# Patient Record
Sex: MALE | Race: WHITE | NOT HISPANIC OR LATINO | Employment: OTHER | URBAN - METROPOLITAN AREA
[De-identification: names, ages, dates, MRNs, and addresses within clinical notes are randomized per-mention and may not be internally consistent; named-entity substitution may affect disease eponyms.]

---

## 2017-03-02 RX ORDER — TRIAMTERENE AND HYDROCHLOROTHIAZIDE 75; 50 MG/1; MG/1
TABLET ORAL
Qty: 90 TABLET | Refills: 2 | Status: SHIPPED | OUTPATIENT
Start: 2017-03-02 | End: 2017-10-22 | Stop reason: SDUPTHER

## 2017-10-23 RX ORDER — TRIAMTERENE AND HYDROCHLOROTHIAZIDE 75; 50 MG/1; MG/1
TABLET ORAL
Qty: 30 TABLET | Refills: 1 | Status: SHIPPED | OUTPATIENT
Start: 2017-10-23 | End: 2018-05-31 | Stop reason: DRUGHIGH

## 2018-03-09 RX ORDER — TRIAMTERENE AND HYDROCHLOROTHIAZIDE 75; 50 MG/1; MG/1
TABLET ORAL
Qty: 30 TABLET | Refills: 1 | Status: SHIPPED | OUTPATIENT
Start: 2018-03-09 | End: 2018-05-13 | Stop reason: SDUPTHER

## 2018-05-14 ENCOUNTER — OFFICE VISIT (OUTPATIENT)
Dept: FAMILY MEDICINE CLINIC | Facility: CLINIC | Age: 73
End: 2018-05-14

## 2018-05-14 VITALS
HEART RATE: 90 BPM | BODY MASS INDEX: 26.27 KG/M2 | OXYGEN SATURATION: 96 % | DIASTOLIC BLOOD PRESSURE: 78 MMHG | SYSTOLIC BLOOD PRESSURE: 130 MMHG | HEIGHT: 67 IN | TEMPERATURE: 98.6 F | WEIGHT: 167.4 LBS

## 2018-05-14 DIAGNOSIS — J20.9 BRONCHITIS WITH BRONCHOSPASM: Primary | ICD-10-CM

## 2018-05-14 DIAGNOSIS — J01.00 ACUTE MAXILLARY SINUSITIS, RECURRENCE NOT SPECIFIED: ICD-10-CM

## 2018-05-14 PROCEDURE — 71046 X-RAY EXAM CHEST 2 VIEWS: CPT | Performed by: INTERNAL MEDICINE

## 2018-05-14 PROCEDURE — 99213 OFFICE O/P EST LOW 20 MIN: CPT | Performed by: INTERNAL MEDICINE

## 2018-05-14 RX ORDER — ALBUTEROL SULFATE 90 UG/1
AEROSOL, METERED RESPIRATORY (INHALATION)
COMMUNITY
Start: 2018-05-11 | End: 2019-06-28

## 2018-05-14 RX ORDER — AMOXICILLIN AND CLAVULANATE POTASSIUM 875; 125 MG/1; MG/1
TABLET, FILM COATED ORAL
COMMUNITY
Start: 2018-05-11 | End: 2018-05-28

## 2018-05-14 RX ORDER — BROMPHENIRAMINE MALEATE, PSEUDOEPHEDRINE HYDROCHLORIDE, AND DEXTROMETHORPHAN HYDROBROMIDE 2; 30; 10 MG/5ML; MG/5ML; MG/5ML
SYRUP ORAL
COMMUNITY
Start: 2018-05-11 | End: 2019-06-28

## 2018-05-14 RX ORDER — METHYLPREDNISOLONE 4 MG/1
TABLET ORAL
COMMUNITY
Start: 2018-05-11 | End: 2019-06-28

## 2018-05-14 RX ORDER — TRIAMTERENE AND HYDROCHLOROTHIAZIDE 75; 50 MG/1; MG/1
TABLET ORAL
Qty: 30 TABLET | Refills: 0 | Status: SHIPPED | OUTPATIENT
Start: 2018-05-14 | End: 2018-05-14 | Stop reason: SDUPTHER

## 2018-05-14 NOTE — PROGRESS NOTES
Subjective   Grzegorz Price is a 72 y.o. male.   Coughing and sinus drainage from one month's time  History of Present Illness   Cough,  Sinus drainiage.  [place] hot  Temp recorded as 99.7 at the [facility] was seen twice here I believe was given amoxicillin initially on return was given Augmentin as well as Medrol Dosepak patient perceives himself as being better now cough tomatoes sinus drainage is now clear still mild cough feels much better including ears and sinuses no history for asthma or COPD.  Patient is felt to have a cure with HIFU for his prostate cancer still undergoing observation.  Very pleased with this no bladder issues or ED issues posttreatment  Review of Systems   All other systems reviewed and are negative.      Objective   Vitals:    05/14/18 1636   BP: 130/78   Pulse: 90   Temp: 98.6 °F (37 °C)   SpO2: 96%   Weight: 75.9 kg (167 lb 6.4 oz)     Physical Exam   Constitutional: He appears well-developed and well-nourished.   HENT:   Head: Normocephalic and atraumatic.   Right Ear: External ear normal.   Left TM obscured by cerumen   Eyes: Conjunctivae are normal. Pupils are equal, round, and reactive to light.   Cardiovascular: Normal rate and regular rhythm.    Pulmonary/Chest: Effort normal.   Faint  wheezes   Abdominal: Soft. Bowel sounds are normal.   Nursing note and vitals reviewed.      No results found for: INR    Procedures    Assessment/Plan     1.  Bronchitis with bronchospasm improved by history patient continues present course of medicine call regarding status in 3 days time i.e. Thursday if he still having problems I'll give him another antibiotic and probably Prednisone 40 mg ×2 days, 30 mg ×2 days, 20 mg ×2 days, 10 mg ×2 days.. Doubt that'll be necessary    2.  Maxillary sinusitis clinically responded to Augmentin plan complete course with call on Thursday to monitor his progress.  Further follow-up contingent on patient's response to medicine.    Much of this encounter note is  an electronic transcription/translation of spoken language to printed text.  The electronic translation of spoken language may permit erroneous, or at times, nonsensical words or phrases to be inadvertently transcribed.  Although I have reviewed the note for such errors, some may still exist. If there are questions or for further clarification, please contact me.

## 2018-05-28 ENCOUNTER — HOSPITAL ENCOUNTER (EMERGENCY)
Facility: HOSPITAL | Age: 73
Discharge: HOME | End: 2018-05-28
Attending: FAMILY MEDICINE | Admitting: FAMILY MEDICINE

## 2018-05-28 ENCOUNTER — APPOINTMENT (OUTPATIENT)
Dept: CT IMAGING | Facility: HOSPITAL | Age: 73
End: 2018-05-28

## 2018-05-28 VITALS
HEART RATE: 79 BPM | RESPIRATION RATE: 18 BRPM | DIASTOLIC BLOOD PRESSURE: 99 MMHG | OXYGEN SATURATION: 94 % | HEIGHT: 67 IN | WEIGHT: 165 LBS | TEMPERATURE: 98.2 F | BODY MASS INDEX: 25.9 KG/M2 | SYSTOLIC BLOOD PRESSURE: 180 MMHG

## 2018-05-28 DIAGNOSIS — H61.22 HEARING LOSS OF LEFT EAR DUE TO CERUMEN IMPACTION: ICD-10-CM

## 2018-05-28 DIAGNOSIS — N39.0 ACUTE UTI: Primary | ICD-10-CM

## 2018-05-28 DIAGNOSIS — K62.5 BRBPR (BRIGHT RED BLOOD PER RECTUM): ICD-10-CM

## 2018-05-28 LAB
ALBUMIN SERPL-MCNC: 3.8 G/DL (ref 3.5–5.2)
ALBUMIN/GLOB SERPL: 1.5 G/DL
ALP SERPL-CCNC: 45 U/L (ref 39–117)
ALT SERPL W P-5'-P-CCNC: 26 U/L (ref 1–41)
ANION GAP SERPL CALCULATED.3IONS-SCNC: 13.4 MMOL/L
AST SERPL-CCNC: 21 U/L (ref 1–40)
BACTERIA UR QL AUTO: ABNORMAL /HPF
BASOPHILS # BLD AUTO: 0.02 10*3/MM3 (ref 0–0.2)
BASOPHILS NFR BLD AUTO: 0.2 % (ref 0–1.5)
BILIRUB SERPL-MCNC: 0.6 MG/DL (ref 0.1–1.2)
BILIRUB UR QL STRIP: NEGATIVE
BUN BLD-MCNC: 20 MG/DL (ref 8–23)
BUN/CREAT SERPL: 19 (ref 7–25)
CALCIUM SPEC-SCNC: 8.9 MG/DL (ref 8.6–10.5)
CHLORIDE SERPL-SCNC: 101 MMOL/L (ref 98–107)
CLARITY UR: CLEAR
CO2 SERPL-SCNC: 26.6 MMOL/L (ref 22–29)
COLOR UR: ABNORMAL
CREAT BLD-MCNC: 1.05 MG/DL (ref 0.76–1.27)
DEPRECATED RDW RBC AUTO: 49.9 FL (ref 37–54)
EOSINOPHIL # BLD AUTO: 0.08 10*3/MM3 (ref 0–0.7)
EOSINOPHIL NFR BLD AUTO: 0.8 % (ref 0.3–6.2)
ERYTHROCYTE [DISTWIDTH] IN BLOOD BY AUTOMATED COUNT: 13.9 % (ref 11.5–14.5)
GFR SERPL CREATININE-BSD FRML MDRD: 70 ML/MIN/1.73
GLOBULIN UR ELPH-MCNC: 2.6 GM/DL
GLUCOSE BLD-MCNC: 90 MG/DL (ref 65–99)
GLUCOSE UR STRIP-MCNC: NEGATIVE MG/DL
HCT VFR BLD AUTO: 56.3 % (ref 40.4–52.2)
HGB BLD-MCNC: 18.9 G/DL (ref 13.7–17.6)
HGB UR QL STRIP.AUTO: NEGATIVE
HOLD SPECIMEN: NORMAL
HOLD SPECIMEN: NORMAL
HYALINE CASTS UR QL AUTO: ABNORMAL /LPF
IMM GRANULOCYTES # BLD: 0 10*3/MM3 (ref 0–0.03)
IMM GRANULOCYTES NFR BLD: 0 % (ref 0–0.5)
KETONES UR QL STRIP: ABNORMAL
LEUKOCYTE ESTERASE UR QL STRIP.AUTO: ABNORMAL
LYMPHOCYTES # BLD AUTO: 1.24 10*3/MM3 (ref 0.9–4.8)
LYMPHOCYTES NFR BLD AUTO: 13 % (ref 19.6–45.3)
MCH RBC QN AUTO: 33 PG (ref 27–32.7)
MCHC RBC AUTO-ENTMCNC: 33.6 G/DL (ref 32.6–36.4)
MCV RBC AUTO: 98.4 FL (ref 79.8–96.2)
MONOCYTES # BLD AUTO: 0.74 10*3/MM3 (ref 0.2–1.2)
MONOCYTES NFR BLD AUTO: 7.8 % (ref 5–12)
MUCOUS THREADS URNS QL MICRO: ABNORMAL /HPF
NEUTROPHILS # BLD AUTO: 7.46 10*3/MM3 (ref 1.9–8.1)
NEUTROPHILS NFR BLD AUTO: 78.2 % (ref 42.7–76)
NITRITE UR QL STRIP: NEGATIVE
PH UR STRIP.AUTO: 6.5 [PH] (ref 5–8)
PLATELET # BLD AUTO: 270 10*3/MM3 (ref 140–500)
PMV BLD AUTO: 10.5 FL (ref 6–12)
POTASSIUM BLD-SCNC: 3.4 MMOL/L (ref 3.5–5.2)
PROT SERPL-MCNC: 6.4 G/DL (ref 6–8.5)
PROT UR QL STRIP: ABNORMAL
RBC # BLD AUTO: 5.72 10*6/MM3 (ref 4.6–6)
RBC # UR: ABNORMAL /HPF
REF LAB TEST METHOD: ABNORMAL
SODIUM BLD-SCNC: 141 MMOL/L (ref 136–145)
SP GR UR STRIP: >=1.03 (ref 1–1.03)
SQUAMOUS #/AREA URNS HPF: ABNORMAL /HPF
UROBILINOGEN UR QL STRIP: ABNORMAL
WBC NRBC COR # BLD: 9.54 10*3/MM3 (ref 4.5–10.7)
WBC UR QL AUTO: ABNORMAL /HPF
WHOLE BLOOD HOLD SPECIMEN: NORMAL
WHOLE BLOOD HOLD SPECIMEN: NORMAL

## 2018-05-28 PROCEDURE — 99285 EMERGENCY DEPT VISIT HI MDM: CPT

## 2018-05-28 PROCEDURE — 74177 CT ABD & PELVIS W/CONTRAST: CPT

## 2018-05-28 PROCEDURE — 81001 URINALYSIS AUTO W/SCOPE: CPT | Performed by: FAMILY MEDICINE

## 2018-05-28 PROCEDURE — 80053 COMPREHEN METABOLIC PANEL: CPT | Performed by: NURSE PRACTITIONER

## 2018-05-28 PROCEDURE — 25010000002 IOPAMIDOL 61 % SOLUTION: Performed by: FAMILY MEDICINE

## 2018-05-28 PROCEDURE — 85025 COMPLETE CBC W/AUTO DIFF WBC: CPT | Performed by: NURSE PRACTITIONER

## 2018-05-28 PROCEDURE — 96360 HYDRATION IV INFUSION INIT: CPT

## 2018-05-28 RX ORDER — CEFDINIR 300 MG/1
300 CAPSULE ORAL 2 TIMES DAILY
Qty: 14 CAPSULE | Refills: 0 | Status: SHIPPED | OUTPATIENT
Start: 2018-05-28 | End: 2018-06-04

## 2018-05-28 RX ORDER — CEFDINIR 300 MG/1
300 CAPSULE ORAL 2 TIMES DAILY
Qty: 14 CAPSULE | Refills: 0 | Status: SHIPPED | OUTPATIENT
Start: 2018-05-28 | End: 2018-05-28

## 2018-05-28 RX ORDER — SODIUM CHLORIDE 0.9 % (FLUSH) 0.9 %
10 SYRINGE (ML) INJECTION AS NEEDED
Status: DISCONTINUED | OUTPATIENT
Start: 2018-05-28 | End: 2018-05-28 | Stop reason: HOSPADM

## 2018-05-28 RX ADMIN — SODIUM CHLORIDE 1000 ML: 9 INJECTION, SOLUTION INTRAVENOUS at 11:24

## 2018-05-28 RX ADMIN — IOPAMIDOL 85 ML: 612 INJECTION, SOLUTION INTRAVENOUS at 11:34

## 2018-05-29 ENCOUNTER — TELEPHONE (OUTPATIENT)
Dept: FAMILY MEDICINE CLINIC | Facility: CLINIC | Age: 73
End: 2018-05-29

## 2018-05-29 ENCOUNTER — TELEPHONE (OUTPATIENT)
Dept: SOCIAL WORK | Facility: HOSPITAL | Age: 73
End: 2018-05-29

## 2018-05-29 DIAGNOSIS — Z12.11 ENCOUNTER FOR SCREENING COLONOSCOPY: Primary | ICD-10-CM

## 2018-05-29 NOTE — TELEPHONE ENCOUNTER
Dr. Arreola Sanchez retired perhaps 2 years ago if he simply needs a colonoscopy refer him to  Dr. Sloan or GI of choice

## 2018-05-29 NOTE — TELEPHONE ENCOUNTER
PT WANTS TO KNOW IF DR. FINN IS STILL PRACTICING HERE IN [place]? HE STATES THAT HE WAS WHO DID HE LAST COLONOSCOPY AND AFTER GOING TO THE EMERGENCY ROOM YESTERDAY, HE WAS TOLD HE NEEDED TO BE SEEN AND HAVE ANOTHER ONE.  THEY REFERRED HIM TO A DIFFERENT PROCTOLOGIST, BUT PT IS ASKING IF HE CAN BE SEEN BY HIS PREVIOUS DR MELGAR?

## 2018-05-29 NOTE — TELEPHONE ENCOUNTER
ER F/U phone call:   Pt states that he is feeling some better. He intends on keeping all scheduled appointments. No other questions or concerns voiced at this time. Inés Ash RN

## 2018-05-31 ENCOUNTER — OFFICE VISIT (OUTPATIENT)
Dept: FAMILY MEDICINE CLINIC | Facility: CLINIC | Age: 73
End: 2018-05-31

## 2018-05-31 VITALS
BODY MASS INDEX: 24.92 KG/M2 | HEIGHT: 67 IN | OXYGEN SATURATION: 98 % | TEMPERATURE: 98.2 F | HEART RATE: 110 BPM | SYSTOLIC BLOOD PRESSURE: 140 MMHG | DIASTOLIC BLOOD PRESSURE: 100 MMHG | WEIGHT: 158.8 LBS

## 2018-05-31 DIAGNOSIS — H61.22 IMPACTED CERUMEN OF LEFT EAR: ICD-10-CM

## 2018-05-31 DIAGNOSIS — D58.2 ELEVATED HEMOGLOBIN (HCC): ICD-10-CM

## 2018-05-31 DIAGNOSIS — N39.0 URINARY TRACT INFECTION WITHOUT HEMATURIA, SITE UNSPECIFIED: Primary | ICD-10-CM

## 2018-05-31 DIAGNOSIS — K62.5 RECTAL BLEEDING: ICD-10-CM

## 2018-05-31 LAB
BACTERIA UR QL AUTO: ABNORMAL /HPF
BILIRUB UR QL STRIP: NEGATIVE
CLARITY UR: CLEAR
COLOR UR: YELLOW
GLUCOSE UR STRIP-MCNC: NEGATIVE MG/DL
HGB UR QL STRIP.AUTO: NEGATIVE
KETONES UR QL STRIP: ABNORMAL
LEUKOCYTE ESTERASE UR QL STRIP.AUTO: ABNORMAL
NITRITE UR QL STRIP: NEGATIVE
PH UR STRIP.AUTO: 7 [PH] (ref 4.6–8)
PROT UR QL STRIP: ABNORMAL
RBC # UR: ABNORMAL /HPF
REF LAB TEST METHOD: ABNORMAL
SP GR UR STRIP: 1.02 (ref 1–1.03)
SQUAMOUS #/AREA URNS HPF: ABNORMAL /HPF
UROBILINOGEN UR QL STRIP: ABNORMAL
WBC UR QL AUTO: ABNORMAL /HPF

## 2018-05-31 PROCEDURE — 69209 REMOVE IMPACTED EAR WAX UNI: CPT | Performed by: INTERNAL MEDICINE

## 2018-05-31 PROCEDURE — 99214 OFFICE O/P EST MOD 30 MIN: CPT | Performed by: INTERNAL MEDICINE

## 2018-05-31 PROCEDURE — 81001 URINALYSIS AUTO W/SCOPE: CPT | Performed by: INTERNAL MEDICINE

## 2018-05-31 NOTE — PROGRESS NOTES
Subjective   Grzegorz Price is a 72 y.o. male.   Recent ER visit with rectal bleeding seen ER released hemoglobin was satisfactory.  CAT scan abdomen was obtained  History of Present Illness   Rectal bleeding  - ct did not show any acute abnormalities did show enlargement of prostate or 0.8 cm which is unchanged patient has hadHIFU treatment for prostate cancer blood pressure up slightly today but missed 2 days of his blood pressure medication and took it again yesterday.  CAT scan did show evidence of sigmoid diverticulosis borderline aneurysmal dilatation 2.3 cm also also unchanged  Missed 2 days of bp med.  Currently on antibiotics for UTI does need follow-up colonoscopy for his rectal bleeding had one several years past with Dr. Zaman who is now retired.  Will refer patient Dr. Milner  Review of Systems   All other systems reviewed and are negative.      Objective   Vitals:    05/31/18 1119   BP: 140/100   Pulse: 110   Temp: 98.2 °F (36.8 °C)   SpO2: 98%   Weight: 72 kg (158 lb 12.8 oz)     Physical Exam   Constitutional: He appears well-developed and well-nourished.   HENT:   Head: Atraumatic.   Right Ear: External ear normal.   Moderate cerumen impaction left ear was irrigated out with H2O with resolution of cerumen impaction left TM unremarkable appearance as is ear canal also post-irrigation   Cardiovascular: Normal rate, regular rhythm and normal heart sounds.    Pulmonary/Chest: Effort normal and breath sounds normal.   Nursing note and vitals reviewed.      No results found for: INR    Procedures   Patient had irrigation left ear with water with resolution of cerumen impaction on left procedure tolerated well without any TM problems or irritation ear canal.  Patient told patient already procedure well  Assessment/Plan     1.  UTI improved continue present treatment await pending UA    2.  Rectal bleeding plan get updated CBC for general surgeon Dr. Norman for colonoscopy    3.  Cerumen impaction left  ear left ear irrigated with resolution patient does have hearing aids which are likely reason for this.  Follow-up when necessary.    4.  Elevated hemoglobin await pending lab if still elevated saturation for hematology referral.  Patient does take supplemental testosterone.    Much of this encounter note is an electronic transcription/translation of spoken language to printed text.  The electronic translation of spoken language may permit erroneous, or at times, nonsensical words or phrases to be inadvertently transcribed.  Although I have reviewed the note for such errors, some may still exist. If there are questions or for further clarification, please contact me.

## 2018-06-01 DIAGNOSIS — N39.0 URINARY TRACT INFECTION WITHOUT HEMATURIA, SITE UNSPECIFIED: Primary | ICD-10-CM

## 2018-06-18 RX ORDER — TRIAMTERENE AND HYDROCHLOROTHIAZIDE 75; 50 MG/1; MG/1
TABLET ORAL
Qty: 30 TABLET | Refills: 0 | Status: SHIPPED | OUTPATIENT
Start: 2018-06-18 | End: 2018-07-20 | Stop reason: SDUPTHER

## 2018-06-19 ENCOUNTER — PREP FOR SURGERY (OUTPATIENT)
Dept: OTHER | Facility: HOSPITAL | Age: 73
End: 2018-06-19

## 2018-06-19 DIAGNOSIS — Z12.11 SCREENING FOR MALIGNANT NEOPLASM OF COLON: Primary | ICD-10-CM

## 2018-06-19 RX ORDER — SODIUM CHLORIDE, SODIUM LACTATE, POTASSIUM CHLORIDE, CALCIUM CHLORIDE 600; 310; 30; 20 MG/100ML; MG/100ML; MG/100ML; MG/100ML
30 INJECTION, SOLUTION INTRAVENOUS CONTINUOUS
Status: CANCELLED | OUTPATIENT
Start: 2018-07-23

## 2018-06-20 PROBLEM — Z12.11 SCREENING FOR MALIGNANT NEOPLASM OF COLON: Status: ACTIVE | Noted: 2018-06-20

## 2018-07-20 RX ORDER — TRIAMTERENE AND HYDROCHLOROTHIAZIDE 75; 50 MG/1; MG/1
TABLET ORAL
Qty: 30 TABLET | Refills: 0 | Status: SHIPPED | OUTPATIENT
Start: 2018-07-20 | End: 2018-07-23 | Stop reason: HOSPADM

## 2018-07-23 ENCOUNTER — ANESTHESIA (OUTPATIENT)
Dept: GASTROENTEROLOGY | Facility: HOSPITAL | Age: 73
End: 2018-07-23

## 2018-07-23 ENCOUNTER — HOSPITAL ENCOUNTER (OUTPATIENT)
Facility: HOSPITAL | Age: 73
Setting detail: HOSPITAL OUTPATIENT SURGERY
Discharge: HOME | End: 2018-07-23
Attending: INTERNAL MEDICINE | Admitting: INTERNAL MEDICINE

## 2018-07-23 ENCOUNTER — ANESTHESIA EVENT (OUTPATIENT)
Dept: GASTROENTEROLOGY | Facility: HOSPITAL | Age: 73
End: 2018-07-23

## 2018-07-23 VITALS
HEART RATE: 63 BPM | DIASTOLIC BLOOD PRESSURE: 92 MMHG | OXYGEN SATURATION: 98 % | SYSTOLIC BLOOD PRESSURE: 158 MMHG | BODY MASS INDEX: 24.25 KG/M2 | TEMPERATURE: 97.7 F | HEIGHT: 67 IN | WEIGHT: 154.5 LBS | RESPIRATION RATE: 16 BRPM

## 2018-07-23 DIAGNOSIS — Z12.11 SCREENING FOR MALIGNANT NEOPLASM OF COLON: ICD-10-CM

## 2018-07-23 PROCEDURE — 45385 COLONOSCOPY W/LESION REMOVAL: CPT | Performed by: INTERNAL MEDICINE

## 2018-07-23 PROCEDURE — 45380 COLONOSCOPY AND BIOPSY: CPT | Performed by: INTERNAL MEDICINE

## 2018-07-23 PROCEDURE — 25010000002 PROPOFOL 10 MG/ML EMULSION: Performed by: NURSE ANESTHETIST, CERTIFIED REGISTERED

## 2018-07-23 PROCEDURE — 88305 TISSUE EXAM BY PATHOLOGIST: CPT | Performed by: INTERNAL MEDICINE

## 2018-07-23 RX ORDER — SODIUM CHLORIDE 0.9 % (FLUSH) 0.9 %
3 SYRINGE (ML) INJECTION AS NEEDED
Status: DISCONTINUED | OUTPATIENT
Start: 2018-07-23 | End: 2018-07-23 | Stop reason: HOSPADM

## 2018-07-23 RX ORDER — PROPOFOL 10 MG/ML
VIAL (ML) INTRAVENOUS AS NEEDED
Status: DISCONTINUED | OUTPATIENT
Start: 2018-07-23 | End: 2018-07-23 | Stop reason: SURG

## 2018-07-23 RX ORDER — LIDOCAINE HYDROCHLORIDE 20 MG/ML
INJECTION, SOLUTION INFILTRATION; PERINEURAL AS NEEDED
Status: DISCONTINUED | OUTPATIENT
Start: 2018-07-23 | End: 2018-07-23 | Stop reason: SURG

## 2018-07-23 RX ORDER — SODIUM CHLORIDE, SODIUM LACTATE, POTASSIUM CHLORIDE, CALCIUM CHLORIDE 600; 310; 30; 20 MG/100ML; MG/100ML; MG/100ML; MG/100ML
30 INJECTION, SOLUTION INTRAVENOUS CONTINUOUS
Status: DISCONTINUED | OUTPATIENT
Start: 2018-07-23 | End: 2018-07-23 | Stop reason: HOSPADM

## 2018-07-23 RX ORDER — SODIUM CHLORIDE, SODIUM LACTATE, POTASSIUM CHLORIDE, CALCIUM CHLORIDE 600; 310; 30; 20 MG/100ML; MG/100ML; MG/100ML; MG/100ML
30 INJECTION, SOLUTION INTRAVENOUS CONTINUOUS
Status: DISCONTINUED | OUTPATIENT
Start: 2018-07-23 | End: 2018-07-23

## 2018-07-23 RX ADMIN — PROPOFOL 20 MG: 10 INJECTION, EMULSION INTRAVENOUS at 10:01

## 2018-07-23 RX ADMIN — LIDOCAINE HYDROCHLORIDE 30 MG: 20 INJECTION, SOLUTION INFILTRATION; PERINEURAL at 09:55

## 2018-07-23 RX ADMIN — PROPOFOL 20 MG: 10 INJECTION, EMULSION INTRAVENOUS at 10:11

## 2018-07-23 RX ADMIN — EPHEDRINE SULFATE 15 MG: 50 INJECTION INTRAMUSCULAR; INTRAVENOUS; SUBCUTANEOUS at 10:15

## 2018-07-23 RX ADMIN — PROPOFOL 20 MG: 10 INJECTION, EMULSION INTRAVENOUS at 10:07

## 2018-07-23 RX ADMIN — PROPOFOL 20 MG: 10 INJECTION, EMULSION INTRAVENOUS at 10:05

## 2018-07-23 RX ADMIN — PROPOFOL 20 MG: 10 INJECTION, EMULSION INTRAVENOUS at 09:59

## 2018-07-23 RX ADMIN — PROPOFOL 20 MG: 10 INJECTION, EMULSION INTRAVENOUS at 10:03

## 2018-07-23 RX ADMIN — PROPOFOL 20 MG: 10 INJECTION, EMULSION INTRAVENOUS at 10:09

## 2018-07-23 RX ADMIN — PROPOFOL 30 MG: 10 INJECTION, EMULSION INTRAVENOUS at 09:57

## 2018-07-23 RX ADMIN — PROPOFOL 50 MG: 10 INJECTION, EMULSION INTRAVENOUS at 09:55

## 2018-07-23 RX ADMIN — SODIUM CHLORIDE, POTASSIUM CHLORIDE, SODIUM LACTATE AND CALCIUM CHLORIDE 30 ML/HR: 600; 310; 30; 20 INJECTION, SOLUTION INTRAVENOUS at 09:23

## 2018-07-23 RX ADMIN — PROPOFOL 20 MG: 10 INJECTION, EMULSION INTRAVENOUS at 10:13

## 2018-07-23 NOTE — ANESTHESIA PREPROCEDURE EVALUATION
Anesthesia Evaluation     Patient summary reviewed and Nursing notes reviewed   NPO Solid Status: > 8 hours  NPO Liquid Status: > 4 hours           Airway   Mallampati: II  Neck ROM: full  No difficulty expected  Dental      Comment: Upper partial    Pulmonary     breath sounds clear to auscultation  (+) a smoker Former,   Cardiovascular     Rhythm: regular    (+) hypertension,       Neuro/Psych  GI/Hepatic/Renal/Endo      Musculoskeletal     Abdominal    Substance History      OB/GYN          Other                        Anesthesia Plan    ASA 2     MAC     intravenous induction   Anesthetic plan and risks discussed with patient.

## 2018-07-23 NOTE — H&P
"[facility]  Pre Procedure History & Physical    Chief Complaint:   Time for my colonoscopy    Subjective     HPI:   72 y.o. male here for screening colonoscopy.  Last colonoscopy 2008 - diverticulosis and hemorrhoids.  Reports episode of rectal bleeding few weeks ago.  Occurred for 2 days.  No pain.  Red blood in toilet.  No family hx of colon CA.      Past Medical History:   Past Medical History:   Diagnosis Date   • Cerumen impaction    • Hypertension    • Prostate cancer (CMS/HCC)        Family History:  Family History   Problem Relation Age of Onset   • No Known Problems Mother    • Throat cancer Father        Social History:   reports that he has quit smoking. He quit after 40.00 years of use. He has never used smokeless tobacco. He reports that he does not drink alcohol or use drugs.    Medications:   Prescriptions Prior to Admission   Medication Sig Dispense Refill Last Dose   • azithromycin (ZITHROMAX) 250 MG tablet Take 2 tablets the first day, then 1 tablet daily for 4 days. 6 tablet 0 Not Taking   • benzonatate (TESSALON PERLES) 100 MG capsule 1-2 tabs q8h prn  #30 30 capsule 2 Not Taking   • brompheniramine-pseudoephedrine-DM 30-2-10 MG/5ML syrup    Not Taking   • MethylPREDNISolone (MEDROL, MING,) 4 MG tablet    Not Taking   • triamterene-hydrochlorothiazide (DYAZIDE) 37.5-25 MG per capsule Take 1 capsule by mouth Every Morning.   Taking   • triamterene-hydrochlorothiazide (MAXZIDE) 75-50 MG per tablet TAKE ONE TABLET BY MOUTH DAILY 30 tablet 0    • VENTOLIN  (90 Base) MCG/ACT inhaler    7/20/2018       Allergies:  Patient has no known allergies.    ROS:    Pertinent items are noted in HPI     Objective     Blood pressure 173/92, pulse 73, temperature 97.7 °F (36.5 °C), temperature source Oral, resp. rate 16, height 170.2 cm (67\"), weight 70.1 kg (154 lb 8 oz), SpO2 99 %.    Physical Exam   Constitutional: Pt is oriented to person, place, and time and well-developed, " well-nourished, and in no distress.   HENT:   Mouth/Throat: Oropharynx is clear and moist.   Neck: Normal range of motion. Neck supple.   Cardiovascular: Normal rate, regular rhythm and normal heart sounds.    Pulmonary/Chest: Effort normal and breath sounds normal. No respiratory distress. No  wheezes.   Abdominal: Soft. Bowel sounds are normal.   Skin: Skin is warm and dry.   Psychiatric: Mood, memory, affect and judgment normal.     Assessment/Plan     Diagnosis:  Encounter for screening for colon cancer    Anticipated Surgical Procedure:  Colonoscopy    The risks, benefits, and alternatives of this procedure have been discussed with the patient or the responsible party- the patient understands and agrees to proceed.

## 2018-07-23 NOTE — DISCHARGE INSTRUCTIONS
For the next 24 hours patient needs to be with a responsible adult.    For 24 hours DO NOT drive, operate machinery, appliances, drink alcohol, make important decisions or sign legal documents.    Start with a light or bland diet and advance to regular diet as tolerated.    Follow recommendations on procedure report provided by your doctor.    Call Dr Hilliard for problems [phone]    Problems may include but not limited to: large amounts of bleeding, trouble breathing, repeated vomiting, severe unrelieved pain, fever or chills.

## 2018-07-23 NOTE — ANESTHESIA POSTPROCEDURE EVALUATION
"Patient: Grzegorz Price    Procedure Summary     Date:  07/23/18 Room / Location:  [facility] ENDOSCOPY 10 / [facility] ENDOSCOPY    Anesthesia Start:  0952 Anesthesia Stop:  1021    Procedure:  COLONOSCOPY into cecum with polypectomy (N/A ) Diagnosis:       Screening for malignant neoplasm of colon      (Screening for malignant neoplasm of colon [Z12.11])    Surgeon:  Eyal Hilliard MD Provider:  Kush Costa MD    Anesthesia Type:  MAC ASA Status:  2          Anesthesia Type: MAC  Last vitals  BP   110/72 (07/23/18 1033)   Temp   36.5 °C (97.7 °F) (07/23/18 0906)   Pulse   83 (07/23/18 1033)   Resp   16 (07/23/18 1033)     SpO2   99 % (07/23/18 1033)     Post Anesthesia Care and Evaluation    Patient location during evaluation: bedside  Patient participation: complete - patient participated  Level of consciousness: awake and alert  Pain management: adequate  Airway patency: patent  Anesthetic complications: No anesthetic complications    Cardiovascular status: acceptable  Respiratory status: acceptable  Hydration status: acceptable    Comments: /72   Pulse 83   Temp 36.5 °C (97.7 °F) (Oral)   Resp 16   Ht 170.2 cm (67\")   Wt 70.1 kg (154 lb 8 oz)   SpO2 99%   BMI 24.20 kg/m²       "

## 2018-07-24 LAB
CYTO UR: NORMAL
LAB AP CASE REPORT: NORMAL
LAB AP CLINICAL INFORMATION: NORMAL
PATH REPORT.FINAL DX SPEC: NORMAL
PATH REPORT.GROSS SPEC: NORMAL

## 2018-08-14 ENCOUNTER — TELEPHONE (OUTPATIENT)
Dept: GASTROENTEROLOGY | Facility: CLINIC | Age: 73
End: 2018-08-14

## 2018-08-20 RX ORDER — TRIAMTERENE AND HYDROCHLOROTHIAZIDE 75; 50 MG/1; MG/1
TABLET ORAL
Qty: 30 TABLET | Refills: 0 | OUTPATIENT
Start: 2018-08-20

## 2018-08-20 NOTE — TELEPHONE ENCOUNTER
This would be for hypertension so if patient is off this and his blood pressure satisfactory don't see need to continue

## 2018-08-22 ENCOUNTER — TELEPHONE (OUTPATIENT)
Dept: FAMILY MEDICINE CLINIC | Facility: CLINIC | Age: 73
End: 2018-08-22

## 2018-08-22 RX ORDER — TRIAMTERENE AND HYDROCHLOROTHIAZIDE 37.5; 25 MG/1; MG/1
1 CAPSULE ORAL EVERY MORNING
Qty: 30 CAPSULE | Refills: 2 | Status: SHIPPED | OUTPATIENT
Start: 2018-08-22 | End: 2018-11-20 | Stop reason: SDUPTHER

## 2018-08-22 NOTE — TELEPHONE ENCOUNTER
PATIENT STATED THAT HOSPITAL DID NOT D/C HIS triamterene-hydrochlorothiazide (DYAZIDE) 37.5-25 MG per capsule AND WANTS TO KNOW IF RX CAN BE CALLED INTO PHARMACY

## 2018-11-20 RX ORDER — TRIAMTERENE AND HYDROCHLOROTHIAZIDE 37.5; 25 MG/1; MG/1
CAPSULE ORAL
Qty: 30 CAPSULE | Refills: 1 | Status: SHIPPED | OUTPATIENT
Start: 2018-11-20 | End: 2019-01-20 | Stop reason: SDUPTHER

## 2019-01-21 RX ORDER — TRIAMTERENE AND HYDROCHLOROTHIAZIDE 37.5; 25 MG/1; MG/1
CAPSULE ORAL
Qty: 30 CAPSULE | Refills: 0 | Status: SHIPPED | OUTPATIENT
Start: 2019-01-21 | End: 2019-02-20 | Stop reason: SDUPTHER

## 2019-02-20 RX ORDER — TRIAMTERENE AND HYDROCHLOROTHIAZIDE 37.5; 25 MG/1; MG/1
CAPSULE ORAL
Qty: 30 CAPSULE | Refills: 0 | Status: SHIPPED | OUTPATIENT
Start: 2019-02-20 | End: 2019-03-22 | Stop reason: SDUPTHER

## 2019-03-22 RX ORDER — TRIAMTERENE AND HYDROCHLOROTHIAZIDE 37.5; 25 MG/1; MG/1
CAPSULE ORAL
Qty: 30 CAPSULE | Refills: 0 | Status: SHIPPED | OUTPATIENT
Start: 2019-03-22 | End: 2019-04-22 | Stop reason: SDUPTHER

## 2019-04-22 RX ORDER — TRIAMTERENE AND HYDROCHLOROTHIAZIDE 37.5; 25 MG/1; MG/1
CAPSULE ORAL
Qty: 15 CAPSULE | Refills: 0 | Status: SHIPPED | OUTPATIENT
Start: 2019-04-22 | End: 2019-05-12 | Stop reason: SDUPTHER

## 2019-05-13 RX ORDER — TRIAMTERENE AND HYDROCHLOROTHIAZIDE 37.5; 25 MG/1; MG/1
CAPSULE ORAL
Qty: 15 CAPSULE | Refills: 0 | Status: SHIPPED | OUTPATIENT
Start: 2019-05-13 | End: 2019-05-31 | Stop reason: SDUPTHER

## 2019-05-31 RX ORDER — TRIAMTERENE AND HYDROCHLOROTHIAZIDE 37.5; 25 MG/1; MG/1
CAPSULE ORAL
Qty: 15 CAPSULE | Refills: 0 | Status: SHIPPED | OUTPATIENT
Start: 2019-05-31 | End: 2019-06-28 | Stop reason: SDUPTHER

## 2019-06-20 RX ORDER — TRIAMTERENE AND HYDROCHLOROTHIAZIDE 37.5; 25 MG/1; MG/1
CAPSULE ORAL
Qty: 15 CAPSULE | Refills: 0 | OUTPATIENT
Start: 2019-06-20

## 2019-06-25 RX ORDER — TRIAMTERENE AND HYDROCHLOROTHIAZIDE 37.5; 25 MG/1; MG/1
CAPSULE ORAL
Qty: 15 CAPSULE | Refills: 0 | OUTPATIENT
Start: 2019-06-25

## 2019-06-25 NOTE — TELEPHONE ENCOUNTER
REFILL ON triamterene-hydrochlorothiazide (DYAZIDE) 37.5-25 MG per capsule      PATIENT HAS APPT ON 06/28/19. PATIENT IS OUT CAN HE GET A REFILL

## 2019-06-28 ENCOUNTER — OFFICE VISIT (OUTPATIENT)
Dept: FAMILY MEDICINE CLINIC | Facility: CLINIC | Age: 74
End: 2019-06-28

## 2019-06-28 VITALS
WEIGHT: 153.2 LBS | TEMPERATURE: 98.4 F | HEART RATE: 74 BPM | DIASTOLIC BLOOD PRESSURE: 92 MMHG | BODY MASS INDEX: 21.93 KG/M2 | OXYGEN SATURATION: 96 % | SYSTOLIC BLOOD PRESSURE: 180 MMHG | HEIGHT: 70 IN

## 2019-06-28 DIAGNOSIS — I10 HYPERTENSION, ESSENTIAL: Primary | ICD-10-CM

## 2019-06-28 DIAGNOSIS — J98.09 RECURRENT BRONCHOSPASM: ICD-10-CM

## 2019-06-28 DIAGNOSIS — R79.89 ABNORMAL BLOOD CREATININE LEVEL: Primary | ICD-10-CM

## 2019-06-28 LAB
ALBUMIN SERPL-MCNC: 3.8 G/DL (ref 3.5–5.2)
ALBUMIN/GLOB SERPL: 1.7 G/DL
ALP SERPL-CCNC: 46 U/L (ref 39–117)
ALT SERPL W P-5'-P-CCNC: 21 U/L (ref 1–41)
ANION GAP SERPL CALCULATED.3IONS-SCNC: 8.8 MMOL/L (ref 5–15)
AST SERPL-CCNC: 16 U/L (ref 1–40)
BILIRUB SERPL-MCNC: 1.2 MG/DL (ref 0.2–1.2)
BUN BLD-MCNC: 18 MG/DL (ref 8–23)
BUN/CREAT SERPL: 12.4 (ref 7–25)
CALCIUM SPEC-SCNC: 8.8 MG/DL (ref 8.6–10.5)
CHLORIDE SERPL-SCNC: 97 MMOL/L (ref 98–107)
CO2 SERPL-SCNC: 32.2 MMOL/L (ref 22–29)
CREAT BLD-MCNC: 1.45 MG/DL (ref 0.76–1.27)
GFR SERPL CREATININE-BSD FRML MDRD: 48 ML/MIN/1.73
GLOBULIN UR ELPH-MCNC: 2.2 GM/DL
GLUCOSE BLD-MCNC: 81 MG/DL (ref 65–99)
POTASSIUM BLD-SCNC: 4.2 MMOL/L (ref 3.5–5.2)
PROT SERPL-MCNC: 6 G/DL (ref 6–8.5)
SODIUM BLD-SCNC: 138 MMOL/L (ref 136–145)

## 2019-06-28 PROCEDURE — 99213 OFFICE O/P EST LOW 20 MIN: CPT | Performed by: INTERNAL MEDICINE

## 2019-06-28 PROCEDURE — 36415 COLL VENOUS BLD VENIPUNCTURE: CPT | Performed by: INTERNAL MEDICINE

## 2019-06-28 PROCEDURE — 80053 COMPREHEN METABOLIC PANEL: CPT | Performed by: INTERNAL MEDICINE

## 2019-06-28 RX ORDER — ALBUTEROL SULFATE 90 UG/1
2 AEROSOL, METERED RESPIRATORY (INHALATION) EVERY 4 HOURS PRN
Qty: 1 INHALER | Refills: 0 | Status: SHIPPED | OUTPATIENT
Start: 2019-06-28 | End: 2019-08-23 | Stop reason: SDUPTHER

## 2019-06-28 RX ORDER — TRIAMTERENE AND HYDROCHLOROTHIAZIDE 37.5; 25 MG/1; MG/1
1 CAPSULE ORAL EVERY MORNING
Qty: 90 CAPSULE | Refills: 3 | Status: SHIPPED | OUTPATIENT
Start: 2019-06-28 | End: 2020-08-03

## 2019-06-28 NOTE — PROGRESS NOTES
Subjective   Grzegorz Price is a 73 y.o. male.   Follow-up on blood pressure which normally runs well patient been out of medicines for about 6 days now.  Is like explanation for elevated blood pressure also had a health visit from [facility] did have his cholesterol checked which I do not see was informed his cholesterol is 133  History of Present Illness recent lipid testing done through [employer] elsewhere which I cannot locate in chart currently he is doing very well history of prostate cancer is followed regularly by his urologist.  Without evidence of recurrence no other problems no chest pain breathing issues will have both allergies and wheezing when he mows the grass needs refill on albuterol inhaler for that.  out of blood pressure medicine since Saturday is like explanation for elevated BP  Review of Systems   All other systems reviewed and are negative.      Objective   Vitals:    06/28/19 0809   BP: 180/92   Pulse: 74   Temp: 98.4 °F (36.9 °C)   SpO2: 96%   Weight: 69.5 kg (153 lb 3.2 oz)     Physical Exam   Constitutional: He appears well-developed and well-nourished.   HENT:   Head: Normocephalic and atraumatic.   Eyes: Conjunctivae are normal. Pupils are equal, round, and reactive to light.   Cardiovascular: Normal rate, regular rhythm and normal heart sounds.   Pulmonary/Chest: Effort normal and breath sounds normal.   Abdominal: Soft. Bowel sounds are normal.   Neurological: He is alert.   Unremarkable gait and station   Skin: Skin is warm and dry.   Nursing note and vitals reviewed.      No results found for: INR    Procedures    Assessment/Plan     Hypertension controlled by history await pending lab recheck 1 years time and as needed.    2.  Recurrent bronchospasm refill albuterol inhaler patient was advised to take antihistamines also an hour before he goes to mow the lawn do not need to address this otherwise there is no other exercise-induced other bronchospasm history   will attempt to  find lipids done through his health screening with his [employer] insurance.    Much of this encounter note is an electronic transcription/translation of spoken language to printed text.  The electronic translation of spoken language may permit erroneous, or at times, nonsensical words or phrases to be inadvertently transcribed.  Although I have reviewed the note for such errors, some may still exist. If there are questions or for further clarification, please contact me.

## 2019-08-23 RX ORDER — ALBUTEROL SULFATE 90 UG/1
AEROSOL, METERED RESPIRATORY (INHALATION)
Qty: 1 INHALER | Refills: 0 | Status: SHIPPED | OUTPATIENT
Start: 2019-08-23

## 2019-11-24 NOTE — TELEPHONE ENCOUNTER
----- Message from Eyal Hilliard MD sent at 7/30/2018  7:45 AM EDT -----  Mix of TAs and one hyperplastic polyp in R colon  Recall 3 years  
----- Message from Jade Mullins sent at 8/14/2018 10:21 AM EDT -----  Regarding: PT CALLED - [facility]  Contact: [phone]  RESULTS  
Patient called back with questions, questions answered, patient verb understanding and is agreeable to the plan.   
Returned patient's phone call, no answer, message left advising as per 's note. Advise to call back for questions. Patient's health maintenance record updated to reflect the need to repeat colonoscopy in 3 years.   
unchanged
N/A

## 2019-11-26 ENCOUNTER — OFFICE VISIT (OUTPATIENT)
Dept: FAMILY MEDICINE CLINIC | Facility: CLINIC | Age: 74
End: 2019-11-26

## 2019-11-26 VITALS
OXYGEN SATURATION: 97 % | DIASTOLIC BLOOD PRESSURE: 90 MMHG | TEMPERATURE: 98 F | SYSTOLIC BLOOD PRESSURE: 150 MMHG | BODY MASS INDEX: 21.56 KG/M2 | HEIGHT: 70 IN | HEART RATE: 78 BPM | WEIGHT: 150.6 LBS

## 2019-11-26 DIAGNOSIS — J20.9 BRONCHITIS WITH BRONCHOSPASM: Primary | ICD-10-CM

## 2019-11-26 DIAGNOSIS — M79.10 MYALGIA: ICD-10-CM

## 2019-11-26 DIAGNOSIS — J32.0 MAXILLARY SINUSITIS, UNSPECIFIED CHRONICITY: ICD-10-CM

## 2019-11-26 DIAGNOSIS — I10 HYPERTENSION, ESSENTIAL: ICD-10-CM

## 2019-11-26 DIAGNOSIS — D58.2 ELEVATED HEMOGLOBIN (HCC): ICD-10-CM

## 2019-11-26 LAB
ERYTHROCYTE [DISTWIDTH] IN BLOOD BY AUTOMATED COUNT: 15.4 % (ref 12.3–15.4)
FLUAV AG NPH QL: NEGATIVE
FLUBV AG NPH QL IA: NEGATIVE
HCT VFR BLD AUTO: 57.8 % (ref 37.5–51)
HGB BLD-MCNC: 18.7 G/DL (ref 13–17.7)
LYMPHOCYTES # BLD AUTO: 1.2 10*3/MM3 (ref 0.7–3.1)
LYMPHOCYTES NFR BLD AUTO: 16.9 % (ref 19.6–45.3)
MCH RBC QN AUTO: 31 PG (ref 26.6–33)
MCHC RBC AUTO-ENTMCNC: 32.3 G/DL (ref 31.5–35.7)
MCV RBC AUTO: 95.8 FL (ref 79–97)
MONOCYTES # BLD AUTO: 0.4 10*3/MM3 (ref 0.1–0.9)
MONOCYTES NFR BLD AUTO: 5.5 % (ref 5–12)
NEUTROPHILS # BLD AUTO: 5.6 10*3/MM3 (ref 1.7–7)
NEUTROPHILS NFR BLD AUTO: 77.6 % (ref 42.7–76)
PLATELET # BLD AUTO: 253 10*3/MM3 (ref 140–450)
PMV BLD AUTO: 7 FL (ref 6–12)
RBC # BLD AUTO: 6.03 10*6/MM3 (ref 4.14–5.8)
WBC NRBC COR # BLD: 7.2 10*3/MM3 (ref 3.4–10.8)

## 2019-11-26 PROCEDURE — 87804 INFLUENZA ASSAY W/OPTIC: CPT | Performed by: INTERNAL MEDICINE

## 2019-11-26 PROCEDURE — 85025 COMPLETE CBC W/AUTO DIFF WBC: CPT | Performed by: INTERNAL MEDICINE

## 2019-11-26 PROCEDURE — 99214 OFFICE O/P EST MOD 30 MIN: CPT | Performed by: INTERNAL MEDICINE

## 2019-11-26 RX ORDER — BENZONATATE 100 MG/1
CAPSULE ORAL
Qty: 30 CAPSULE | Refills: 0 | Status: SHIPPED | OUTPATIENT
Start: 2019-11-26 | End: 2020-01-02

## 2019-11-26 RX ORDER — ALBUTEROL SULFATE 90 UG/1
2 AEROSOL, METERED RESPIRATORY (INHALATION) EVERY 4 HOURS PRN
Qty: 1 INHALER | Refills: 0 | Status: SHIPPED | OUTPATIENT
Start: 2019-11-26 | End: 2020-01-02 | Stop reason: SDUPTHER

## 2019-11-26 RX ORDER — PREDNISONE 10 MG/1
10 TABLET ORAL DAILY
Qty: 20 TABLET | Refills: 0 | Status: SHIPPED | OUTPATIENT
Start: 2019-11-26 | End: 2020-01-02

## 2019-11-26 RX ORDER — AMLODIPINE BESYLATE 5 MG/1
5 TABLET ORAL DAILY
Qty: 30 TABLET | Refills: 1 | Status: SHIPPED | OUTPATIENT
Start: 2019-11-26 | End: 2020-09-21

## 2019-11-26 RX ORDER — CLINDAMYCIN HYDROCHLORIDE 300 MG/1
300 CAPSULE ORAL 3 TIMES DAILY
Qty: 30 CAPSULE | Refills: 0 | Status: SHIPPED | OUTPATIENT
Start: 2019-11-26 | End: 2019-12-06

## 2019-11-26 NOTE — PROGRESS NOTES
Subjective   Grzegorz Price is a 73 y.o. male.  Recent illness going on approximately 1 week's time began with just cough and some wheezing noted now with yellow sputum production some along way signs of came via with yellow sinus drainage pressure one\sinuses were temperature does not have a history of asthma.  Blood pressure is also up today normally runs slightly elevated at 150/90 patient combined with medicines we will add amlodipine to his regimen with recheck in 6 weeks regarding blood pressure.  Body mass index is 21.61 kg/m².  History of Present Illness   Recent illness as described above no temperature minimal aches thinks he got up from a coworker initially cough proceeding to wheezing producing yellow sputum and sinus involvement with yellow sinus drainage pressure more so over the cheeks.  No temperature with this pressure up somewhat we will add amlodipine to his regimen with minimal aches we will proceed with a flu swab CBC patient has not had a flu shot for the area since he is wheezing probably give steroids and for any flu shot on today's visit if test for flu should be negative  Drug allergies are none.  Family history positive for throat cancer.  Former smoker.  Review of Systems   HENT: Positive for congestion and postnasal drip.    Respiratory: Positive for cough and wheezing.        Objective   Vitals:    11/26/19 1004   BP: 150/90   Pulse: 78   Temp: 98 °F (36.7 °C)   SpO2: 97%   Weight: 68.3 kg (150 lb 9.6 oz)     Physical Exam   Constitutional: He appears well-developed and well-nourished.   HENT:   Head: Normocephalic and atraumatic.   Eyes: Conjunctivae are normal. Pupils are equal, round, and reactive to light.   Cardiovascular: Normal rate, regular rhythm and normal heart sounds.   Pulmonary/Chest: Effort normal.   Rare wheeze, no rales.   Abdominal: Soft. Bowel sounds are normal.   Neurological: He is alert.   Unremarkable gait and station   Skin: Skin is warm and dry.   Nursing note  and vitals reviewed.      No results found for: INR    Procedures  Assessment/Plan   1.  Bronchitis with bronchospasm plan clindamycin 300 3 times daily for 10 days time, prednisone 40 mg Ã-2 days, 30 mg Ã-2 days, 20 mg Ã-2 days, 10 mg Ã-2 days.,  Tessalon, albuterol, Breo 100 a sample.    2.  Myalgias flu swab is negative observation only see #1    3.  Maxillary sinusitis plan clindamycin 300 mg as above follow-up or call if not well in 10 days time    4.  Hypertension suboptimally controlled add amlodipine 5 mg daily to her regimen with formal follow-up in 6 weeks time    5.  Elevated hemoglobin referral to Dr. Suárez group CBC group similar elevation in the past we did have to encourage him to do formal follow-up does use testosterone which he thinks is a likely cause while he may be right he nonetheless needs a work-up to rule out other entities.

## 2020-01-02 ENCOUNTER — OFFICE VISIT (OUTPATIENT)
Dept: FAMILY MEDICINE CLINIC | Facility: CLINIC | Age: 75
End: 2020-01-02

## 2020-01-02 VITALS
OXYGEN SATURATION: 98 % | TEMPERATURE: 98.4 F | WEIGHT: 152 LBS | SYSTOLIC BLOOD PRESSURE: 150 MMHG | BODY MASS INDEX: 21.76 KG/M2 | HEART RATE: 67 BPM | DIASTOLIC BLOOD PRESSURE: 84 MMHG | HEIGHT: 70 IN

## 2020-01-02 DIAGNOSIS — I10 HYPERTENSION, ESSENTIAL: ICD-10-CM

## 2020-01-02 DIAGNOSIS — R79.89 ABNORMAL BLOOD CREATININE LEVEL: ICD-10-CM

## 2020-01-02 DIAGNOSIS — Z00.00 MEDICARE ANNUAL WELLNESS VISIT, INITIAL: Primary | ICD-10-CM

## 2020-01-02 DIAGNOSIS — J20.9 BRONCHITIS WITH BRONCHOSPASM: ICD-10-CM

## 2020-01-02 DIAGNOSIS — R79.89 CREATININE ELEVATION: ICD-10-CM

## 2020-01-02 LAB
ALBUMIN SERPL-MCNC: 4.4 G/DL (ref 3.5–5.2)
ALBUMIN/GLOB SERPL: 1.8 G/DL
ALP SERPL-CCNC: 44 U/L (ref 39–117)
ALT SERPL W P-5'-P-CCNC: 35 U/L (ref 1–41)
ANION GAP SERPL CALCULATED.3IONS-SCNC: 12 MMOL/L (ref 5–15)
AST SERPL-CCNC: 34 U/L (ref 1–40)
BILIRUB SERPL-MCNC: 0.9 MG/DL (ref 0.2–1.2)
BUN BLD-MCNC: 29 MG/DL (ref 8–23)
BUN/CREAT SERPL: 17.4 (ref 7–25)
CALCIUM SPEC-SCNC: 9.5 MG/DL (ref 8.6–10.5)
CHLORIDE SERPL-SCNC: 96 MMOL/L (ref 98–107)
CO2 SERPL-SCNC: 32 MMOL/L (ref 22–29)
CREAT BLD-MCNC: 1.67 MG/DL (ref 0.76–1.27)
GFR SERPL CREATININE-BSD FRML MDRD: 41 ML/MIN/1.73
GLOBULIN UR ELPH-MCNC: 2.4 GM/DL
GLUCOSE BLD-MCNC: 77 MG/DL (ref 65–99)
POTASSIUM BLD-SCNC: 4 MMOL/L (ref 3.5–5.2)
PROT SERPL-MCNC: 6.8 G/DL (ref 6–8.5)
SODIUM BLD-SCNC: 140 MMOL/L (ref 136–145)

## 2020-01-02 PROCEDURE — 80053 COMPREHEN METABOLIC PANEL: CPT | Performed by: INTERNAL MEDICINE

## 2020-01-02 PROCEDURE — 71046 X-RAY EXAM CHEST 2 VIEWS: CPT | Performed by: INTERNAL MEDICINE

## 2020-01-02 PROCEDURE — 99213 OFFICE O/P EST LOW 20 MIN: CPT | Performed by: INTERNAL MEDICINE

## 2020-01-02 PROCEDURE — G0438 PPPS, INITIAL VISIT: HCPCS | Performed by: INTERNAL MEDICINE

## 2020-01-02 RX ORDER — PREDNISONE 10 MG/1
10 TABLET ORAL DAILY
Qty: 20 TABLET | Refills: 0 | Status: SHIPPED | OUTPATIENT
Start: 2020-01-02 | End: 2020-09-21

## 2020-01-02 RX ORDER — BUDESONIDE AND FORMOTEROL FUMARATE DIHYDRATE 160; 4.5 UG/1; UG/1
AEROSOL RESPIRATORY (INHALATION)
Qty: 1 INHALER | Refills: 0 | Status: SHIPPED | OUTPATIENT
Start: 2020-01-02 | End: 2020-09-21

## 2020-01-02 RX ORDER — ALBUTEROL SULFATE 90 UG/1
2 AEROSOL, METERED RESPIRATORY (INHALATION) EVERY 4 HOURS PRN
Qty: 1 INHALER | Refills: 0 | Status: SHIPPED | OUTPATIENT
Start: 2020-01-02 | End: 2020-02-12

## 2020-01-02 RX ORDER — LEVOFLOXACIN 500 MG/1
500 TABLET, FILM COATED ORAL DAILY
Qty: 10 TABLET | Refills: 0 | Status: SHIPPED | OUTPATIENT
Start: 2020-01-02 | End: 2020-09-21

## 2020-01-02 RX ORDER — MONTELUKAST SODIUM 10 MG/1
TABLET ORAL
Qty: 30 TABLET | Refills: 11 | Status: SHIPPED | OUTPATIENT
Start: 2020-01-02 | End: 2020-09-21

## 2020-01-02 NOTE — PATIENT INSTRUCTIONS
Medicare Wellness  Personal Prevention Plan of Service     Date of Office Visit:  2020  Encounter Provider:  Nikita Mendez MD  Place of Service:  [facility]  Patient Name: Grzegorz Price  :  1945    As part of the Medicare Wellness portion of your visit today, we are providing you with this personalized preventive plan of services (PPPS). This plan is based upon recommendations of the [place] Preventive Services Task Force (USPSTF) and the Advisory Committee on Immunization Practices (ACIP).    This lists the preventive care services that should be considered, and provides dates of when you are due. Items listed as completed are up-to-date and do not require any further intervention.    Health Maintenance   Topic Date Due   • TDAP/TD VACCINES (1 - Tdap) 1957   • HEPATITIS C SCREENING  10/21/2016   • ZOSTER VACCINE (1 of 2) 2021 (Originally 1996)   • Pneumococcal Vaccine Once at 65 Years Old  2021 (Originally 2011)   • MEDICARE ANNUAL WELLNESS  2021   • COLONOSCOPY  2021   • AAA SCREEN (ONE-TIME)  Completed   • INFLUENZA VACCINE  Addressed       No orders of the defined types were placed in this encounter.      No follow-ups on file.

## 2020-01-02 NOTE — PROGRESS NOTES
The ABCs of the Annual Wellness Visit  Initial Medicare Wellness Visit    Chief Complaint   Patient presents with   • Cough   • Medicare Wellness-Initial Visit       Subjective   History of Present Illness:  Grzegorz Price is a 74 y.o. male who presents for an Initial Medicare Wellness Visit.    HEALTH RISK ASSESSMENT    Recent Hospitalizations:  No hospitalization(s) within the last year.    Current Medical Providers:  Patient Care Team:  Nikita Mendez Jr., MD as PCP - General  Yoni Giles MD as Consulting Physician (Hematology and Oncology)  Nikita Mendez Jr., MD as Referring Physician (Internal Medicine)    Smoking Status:  Social History     Tobacco Use   Smoking Status Former Smoker   • Years: 40.00   Smokeless Tobacco Never Used       Alcohol Consumption:  Social History     Substance and Sexual Activity   Alcohol Use No       Depression Screen:   PHQ-2/PHQ-9 Depression Screening 1/2/2020   Little interest or pleasure in doing things 0   Feeling down, depressed, or hopeless 0   Trouble falling or staying asleep, or sleeping too much 0   Feeling tired or having little energy 0   Poor appetite or overeating 0   Feeling bad about yourself - or that you are a failure or have let yourself or your family down 0   Trouble concentrating on things, such as reading the newspaper or watching television 0   Moving or speaking so slowly that other people could have noticed. Or the opposite - being so fidgety or restless that you have been moving around a lot more than usual 0   Thoughts that you would be better off dead, or of hurting yourself in some way 0   Total Score 0   If you checked off any problems, how difficult have these problems made it for you to do your work, take care of things at home, or get along with other people? Not difficult at all       Fall Risk Screen:  STEADI Fall Risk Assessment has not been completed.    Health Habits and Functional and Cognitive Screening:  Functional &  Cognitive Status 1/2/2020   Do you have difficulty preparing food and eating? No   Do you have difficulty bathing yourself, getting dressed or grooming yourself? No   Do you have difficulty using the toilet? No   Do you have difficulty moving around from place to place? No   Do you have trouble with steps or getting out of a bed or a chair? No   Current Diet Well Balanced Diet   Dental Exam Up to date   Eye Exam Up to date   Exercise (times per week) 4 times per week   Current Exercise Activities Include Weightlifting   Do you need help using the phone?  No   Are you deaf or do you have serious difficulty hearing?  No   Do you need help with transportation? No   Do you need help shopping? No   Do you need help preparing meals?  No   Do you need help with housework?  No   Do you need help with laundry? No   Do you need help taking your medications? No   Do you need help managing money? No   Do you ever drive or ride in a car without wearing a seat belt? No   Have you felt unusual stress, anger or loneliness in the last month? No   Who do you live with? Alone   If you need help, do you have trouble finding someone available to you? No   Do you have difficulty concentrating, remembering or making decisions? No         Does the patient have evidence of cognitive impairment? No    Asprin use counseling:Does not need ASA (and currently is not on it)    Age-appropriate Screening Schedule:  Refer to the list below for future screening recommendations based on patient's age, sex and/or medical conditions. Orders for these recommended tests are listed in the plan section. The patient has been provided with a written plan.    Health Maintenance   Topic Date Due   • TDAP/TD VACCINES (1 - Tdap) 12/27/1956   • ZOSTER VACCINE (1 of 2) 01/14/2021 (Originally 12/27/1996)   • COLONOSCOPY  07/23/2021   • INFLUENZA VACCINE  Addressed          The following portions of the patient's history were reviewed and updated as appropriate:  "allergies, current medications, past family history, past medical history, past social history, past surgical history and problem list.    Outpatient Medications Prior to Visit   Medication Sig Dispense Refill   • ALBUTEROL SULFATE  (90 Base) MCG/ACT inhaler INHALE TWO PUFFS BY MOUTH EVERY 4 HOURS AS NEEDED FOR WHEEZING WHEN NECESSARY FOR DYSPNEA 1 inhaler 0   • amLODIPine (NORVASC) 5 MG tablet Take 1 tablet by mouth Daily. 30 tablet 1   • triamterene-hydrochlorothiazide (DYAZIDE) 37.5-25 MG per capsule Take 1 capsule by mouth Every Morning. 90 capsule 3   • albuterol sulfate  (90 Base) MCG/ACT inhaler Inhale 2 puffs Every 4 (Four) Hours As Needed for Wheezing. 2 puffs every 4 hours when necessary dyspnea 1 inhaler 0   • benzonatate (TESSALON PERLES) 100 MG capsule 1-2 tabs q8h prn  #30 30 capsule 0   • predniSONE (DELTASONE) 10 MG tablet Take 1 tablet by mouth Daily. 4 tabs x 2d, 3 tabs x2d,2 tabs x 2d, 1 tab x 2d 20 tablet 0     No facility-administered medications prior to visit.        Patient Active Problem List   Diagnosis   • Screening for malignant neoplasm of colon       Advanced Care Planning:  Patient does not have an advance directive - not interested in additional information    Review of Systems    Compared to one year ago, the patient feels his physical health is the same.  Compared to one year ago, the patient feels his mental health is the same.    Reviewed chart for potential of high risk medication in the elderly: yes  Reviewed chart for potential of harmful drug interactions in the elderly:yes    Objective         Vitals:    01/02/20 0819   BP: 150/84   BP Location: Left arm   Patient Position: Sitting   Cuff Size: Adult   Pulse: 67   Temp: 98.4 °F (36.9 °C)   TempSrc: Oral   SpO2: 98%   Weight: 68.9 kg (152 lb)   Height: 177.8 cm (70\")   PainSc: 0-No pain       Body mass index is 21.81 kg/m².  Discussed the patient's BMI with him. The BMI is in the acceptable range.    Physical " Exam          Assessment/Plan   Medicare Risks and Personalized Health Plan  CMS Preventative Services Quick Reference  Immunizations Discussed/Encouraged (specific immunizations; Influenza, Prevnar and Shingrix )    The above risks/problems have been discussed with the patient.  Pertinent information has been shared with the patient in the After Visit Summary.  Follow up plans and orders are seen below in the Assessment/Plan Section.    There are no diagnoses linked to this encounter.  Follow Up:  No follow-ups on file.     An After Visit Summary and PPPS were given to the patient.

## 2020-01-02 NOTE — PROGRESS NOTES
Subjective   Grzegorz Price is a 74 y.o. male.  Medicare wellness visit initial also still has cough follow-up from late November with same  Body mass index is 21.81 kg/m².  History of Present Illness Medicare wellness visit initial also still with cough was treated in late November was seen in urgent care prior to that or the [facility] some sputum when we did give him steroids he is well for about 4 days time he does know that if he goes away to place such as [place] or [place] the cough goes away as well.  Regaining leaves seems also sodium off also there is no history of childhood asthma.  No increased temperature with this we did not get a chest x-ray last time so we will proceed with that today cough is worse at nighttime when tries to sleep.  Is elevated creatinine get updated values on that  Cough still cough some wheezing began   Mid November  Review of Systems   Respiratory: Positive for cough.    All other systems reviewed and are negative.      Objective   Vitals:    01/02/20 0819   BP: 150/84   Pulse: 67   Temp: 98.4 °F (36.9 °C)   SpO2: 98%   Weight: 68.9 kg (152 lb)     Physical Exam   Constitutional: He appears well-developed and well-nourished.   HENT:   Head: Normocephalic and atraumatic.   Right Ear: External ear normal.   Left Ear: External ear normal.   Mouth/Throat: Oropharynx is clear and moist.   Eyes: Pupils are equal, round, and reactive to light. Conjunctivae are normal.   Cardiovascular: Normal rate, regular rhythm and normal heart sounds.   Pulmonary/Chest: Effort normal and breath sounds normal.   No rales or wheezes at present   Abdominal: Soft. Bowel sounds are normal.   Neurological: He is alert.   Unremarkable gait and station   Skin: Skin is dry.   Nursing note and vitals reviewed.      No results found for: INR    Procedures  Peak flows chest x-ray PA and lateral views obtained.  We do have a comparison available from 5/14/2018 without changes noted.  There is some mild  spurring of thoracic spine seen on lateral view no other bony or soft tissue abnormalities are noted.  No active parenchymal infiltrates seen.  Assessment/Plan   1.  Medicare wellness visit initial    2.  Bronchitis with bronchospasm plan referral to pulmonary, prednisone 40 mg Ã-2 days, 30 mg Ã-2 days, 20 mg Ã-2 days, 10 mg Ã-2 days.,  Albuterol, Symbicort 160, Levaquin 500 comes 10 days follow-up or call if not well in 10 days time.  Singulair trial as well 10 mg daily    3.  Creatinine elevation get updated values    4.  Hypertension patient continue to monitor mildly elevated today    Much of this encounter note is an electronic transcription/translation of spoken language to printed text.  The electronic translation of spoken language may permit erroneous, or at times, nonsensical words or phrases to be inadvertently transcribed.  Although I have reviewed the note for such errors, some may still exist. If there are questions or for further clarification, please contact me.

## 2020-01-06 DIAGNOSIS — N28.9 ABNORMAL KIDNEY FUNCTION: Primary | ICD-10-CM

## 2020-02-12 RX ORDER — ALBUTEROL SULFATE 90 UG/1
AEROSOL, METERED RESPIRATORY (INHALATION)
Qty: 18 G | Refills: 5 | Status: SHIPPED | OUTPATIENT
Start: 2020-02-12 | End: 2020-09-21 | Stop reason: SDUPTHER

## 2020-04-30 ENCOUNTER — TELEMEDICINE (OUTPATIENT)
Dept: ONCOLOGY | Facility: CLINIC | Age: 75
End: 2020-04-30

## 2020-04-30 ENCOUNTER — APPOINTMENT (OUTPATIENT)
Dept: LAB | Facility: HOSPITAL | Age: 75
End: 2020-04-30

## 2020-04-30 DIAGNOSIS — D72.9 NEUTROPHILIA: ICD-10-CM

## 2020-04-30 DIAGNOSIS — D75.1 SECONDARY ERYTHROCYTOSIS: Primary | ICD-10-CM

## 2020-04-30 DIAGNOSIS — Z78.9 ELECTRONIC CIGARETTE USE: ICD-10-CM

## 2020-04-30 PROCEDURE — 99204 OFFICE O/P NEW MOD 45 MIN: CPT | Performed by: INTERNAL MEDICINE

## 2020-04-30 NOTE — PROGRESS NOTES
Subjective     REASON FOR CONSULTATION:  Provide an opinion on any further workup or treatment on:    Polycythemia                       REQUESTING PHYSICIAN: Jered Solis MD    RECORDS OBTAINED: Records of the patients history including those obtained from the referring provider were reviewed and summarized in detail.    HISTORY OF PRESENT ILLNESS:    Grzegorz Price is a 74 y.o. patient who was referred for evaluation of polycythemia.    This was scheduled as a telehealth visit due to patient safety concerns due to the coronavirus pandemic.    Patient follows with Dr. Tamayo and with his allergy specialist, Dr. Solis.  He was noted to have increase in his hemoglobin hematocrit levels and was referred to our office for further evaluation.  Patient states that he became aware of this earlier this year.    Patient reports having bronchial asthma and emphysema.  He is on inhalers under the care of Dr. Solis.  He states that he was on testosterone replacement up until 2 months ago but could not tell me who prescribed it for him.  This was in the form of testosterone injections.    Patient states he used to take a supplement when he exercises.  He did not know if it contains iron.  He states that he was taking iron supplement up until recently.     Patient denies chest pain or shortness of breath.    REVIEW OF SYSTEMS:  Review of Systems   Constitutional: Negative for fatigue.   Respiratory: Negative for shortness of breath.    Cardiovascular: Negative for chest pain.   Gastrointestinal: Negative for abdominal pain.   Skin: Negative for rash.   Neurological: Negative for headaches.       Past Medical History:   Diagnosis Date   • Cerumen impaction    • Hypertension    • Prostate cancer (CMS/HCC)        Past Surgical History:   Procedure Laterality Date   • COLONOSCOPY     • COLONOSCOPY N/A 7/23/2018    Procedure: COLONOSCOPY into cecum with polypectomy;  Surgeon: Eyal Hilliard MD;  Location: [facility]  ENDOSCOPY;  Service: Gastroenterology   • HERNIA REPAIR     • TONSILLECTOMY         Social History     Socioeconomic History   • Marital status: Single     Spouse name: Not on file   • Number of children: Not on file   • Years of education: Not on file   • Highest education level: Not on file   Tobacco Use   • Smoking status: Former Smoker     Years: 40.00   • Smokeless tobacco: Never Used   Substance and Sexual Activity   • Alcohol use: No   • Drug use: No   • Sexual activity: Defer       Cancer-related family history includes Throat cancer in his father.    MEDICATIONS:    Current Outpatient Medications:   •  ALBUTEROL SULFATE  (90 Base) MCG/ACT inhaler, INHALE TWO PUFFS BY MOUTH EVERY 4 HOURS AS NEEDED FOR WHEEZING WHEN NECESSARY FOR DYSPNEA, Disp: 1 inhaler, Rfl: 0  •  ALBUTEROL SULFATE  (90 Base) MCG/ACT inhaler, INHALE TWO PUFFS BY MOUTH EVERY 4 HOURS AS NEEDED, Disp: 18 g, Rfl: 5  •  amLODIPine (NORVASC) 5 MG tablet, Take 1 tablet by mouth Daily., Disp: 30 tablet, Rfl: 1  •  budesonide-formoterol (SYMBICORT) 160-4.5 MCG/ACT inhaler, 2 puffs twice a day, Disp: 1 inhaler, Rfl: 0  •  levoFLOXacin (LEVAQUIN) 500 MG tablet, Take 1 tablet by mouth Daily. 1 tab qd x 10d, Disp: 10 tablet, Rfl: 0  •  montelukast (SINGULAIR) 10 MG tablet, One PO daily, Disp: 30 tablet, Rfl: 11  •  predniSONE (DELTASONE) 10 MG tablet, Take 1 tablet by mouth Daily. 4 tabs x 2d, 3 tabs x2d,2 tabs x 2d, 1 tab x 2d, Disp: 20 tablet, Rfl: 0  •  triamterene-hydrochlorothiazide (DYAZIDE) 37.5-25 MG per capsule, Take 1 capsule by mouth Every Morning., Disp: 90 capsule, Rfl: 3     ALLERGIES:  No Known Allergies     Objective   VITAL SIGNS:  No vitals obtained - telehealth visit    Wt Readings from Last 3 Encounters:   01/02/20 68.9 kg (152 lb)   11/26/19 68.3 kg (150 lb 9.6 oz)   06/28/19 69.5 kg (153 lb 3.2 oz)       PHYSICAL EXAMINATION  Not performed - Telehealth visit.     RESULT REVIEW:     CBC on 2/27/2020 revealed a  hemoglobin of 19.5, hematocrit 58.2%, WBC 10,700 platelets 287,000. The differential count revealed 9300 neutrophils, 900 lymphocytes, 500 monocytes.  Monocytes were 0.  Basophils were 0.    Ferritin was 24      Component      Latest Ref Rng & Units 12/29/2016 5/28/2018 11/26/2019   WBC      3.40 - 10.80 10*3/mm3 7.35 9.54 7.20   RBC      4.14 - 5.80 10*6/mm3 5.67 5.72 6.03 (H)   Hemoglobin      13.0 - 17.7 g/dL 18.3 (H) 18.9 (H) 18.7 (H)   Hematocrit      37.5 - 51.0 % 55.4 (H) 56.3 (H) 57.8 (H)   MCV      79.0 - 97.0 fL 97.7 (H) 98.4 (H) 95.8   MCH      26.6 - 33.0 pg 32.3 33.0 (H) 31.0   MCHC      31.5 - 35.7 g/dL 33.0 33.6 32.3   RDW      12.3 - 15.4 % 14.2 13.9 15.4   RDW-SD      37.0 - 54.0 fl 51.3 49.9    MPV      6.0 - 12.0 fL 10.3 10.5 7.0   Platelets      140 - 450 10*3/mm3 216 270 253   Neutrophil Rel %      42.7 - 76.0 % 64.4 78.2 (H) 77.6 (H)   Lymphocyte Rel %      19.6 - 45.3 % 26.0 13.0 (L) 16.9 (L)   Monocyte Rel %      5.0 - 12.0 % 8.0 7.8 5.5   Eosinophil Rel %      0.3 - 6.2 % 1.5 0.8    Basophil Rel %      0.0 - 1.5 % 0.1 0.2    Immature Granulocyte Rel %      0.0 - 0.5 % 0.0 0.0    Neutrophils Absolute      1.70 - 7.00 10*3/mm3 4.73 7.46 5.60   Lymphocytes Absolute      0.70 - 3.10 10*3/mm3 1.91 1.24 1.20   Monocytes Absolute      0.10 - 0.90 10*3/mm3 0.59 0.74 0.40   Eosinophils Absolute      0.00 - 0.70 10*3/mm3 0.11 0.08    Basophils Absolute      0.00 - 0.20 10*3/mm3 0.01 0.02    Immature Grans, Absolute      0.00 - 0.03 10*3/mm3 0.00 0.00        CT scan of the abdomen and pelvis with intravenous contrast     HISTORY: Generalized abdominal pain. Possible gastroenteritis or  colitis.     FINDINGS: The CT scan was performed as an emergency procedure through  the abdomen and pelvis with intravenous contrast and compared to the  previous CT scans of the abdomen and pelvis dated 11/23/2016. The  following findings are present:     1. The lung bases are clear. The liver contains several small  hepatic  cysts which are unchanged from the previous exam. The spleen contains  multiple calcified granulomas which are unchanged. The pancreas is  normal in size and there is a minimal area of decreased density in the  tail of pancreas measuring 8 mm. Allowing for the absence of contrast on  the previous study, I believe this is unchanged and is therefore of  doubtful significance. The adrenal glands and right kidney are  unremarkable. There is a small left renal cyst which is unchanged.     2. The gallbladder shows no definite gallstones or wall thickening.  There is moderate calcification of the abdominal aorta with borderline  aneurysmal dilatation measuring 2.3 cm that is unchanged. There is no  retroperitoneal lymphadenopathy.     3. The large and small bowel loops are normal in caliber and show no  inflammatory change. There is a moderate amount of stool throughout the  colon. In the pelvis there is sigmoid diverticulosis without CT evidence  of diverticulitis. There is a small left inguinal ring hernia containing  mesenteric fat which is unchanged. There is also mild enlargement of the  prostate measuring 4.8 cm which is unchanged.           Radiation dose reduction techniques were utilized, including automated  exposure control and exposure modulation based on body size.     This report was finalized on 5/28/2018 12:56 PM by Dr. Aris Yanes M.D.        Assessment/Plan   1.  Erythrocytosis.  It dates back to at 2016 and his hemoglobin was 18.3 and hematocrit was 55.4%.  He had a normal platelet count.  His WBC count has been normal except for the last lab from on 2/27/2020.    This is most consistent with secondary erythrocytosis attributed to his underlying emphysema, smoking and recent testosterone replacement therapy.  An underlying bone marrow pathology like polycythemia vera is considered less likely.    I explained the condition to the patient.  I explained the potential complications including  stroke, heart attack as well as development of venous thromboembolism.  With his hematocrit of 58%, I recommended blood donation or therapeutic phlebotomy.  He is interested in donating blood.    2.  Leukocytosis with Neutrophilia.  This was identified on the lab on 2/27/2020.  It was primarily an increase in the neutrophil count.  No increase in the monocytes or basophils.  This is most likely attributed to the bronchitis and steroid therapy in January 2020.    PLAN:    1.  With his hematocrit at 58%, I recommended that he donates blood in 1 to 2 weeks.  Patient indicated that he would do so.    2.  I recommended avoiding iron and iron-containing multivitamins.  I also recommended cutting down on intake of iron rich foods.    3.  I recommended smoking cessation.  I explained to him that this will help in improving his blood counts.    4.  I recommended maintaining adequate hydration.    5.  I asked him to not restart testosterone replacement.    We will schedule the patient for follow-up in 2 months with repeat CBC.  We will also obtain ferritin and iron panel.    Unable to complete visit using a video connection to the patient. A phone visit was used to complete this visits. Total time of discussion was 17 minutes.         Yoni Giles MD  04/30/20

## 2020-05-11 ENCOUNTER — TELEPHONE (OUTPATIENT)
Dept: ONCOLOGY | Facility: CLINIC | Age: 75
End: 2020-05-11

## 2020-05-11 NOTE — TELEPHONE ENCOUNTER
Pt refused to talk to RN as well.  Message sent to Dr. Giles    Patient would like for Dr. Giles to call him.    He declined to say what it was concerning.  He said Dr. Giles would know.    [phone]

## 2020-05-11 NOTE — TELEPHONE ENCOUNTER
Patient would like for Dr. Giles to call him.    He declined to say what it was concerning.  He said Dr. Giles would know.    [phone]

## 2020-05-12 ENCOUNTER — TELEPHONE (OUTPATIENT)
Dept: ONCOLOGY | Facility: CLINIC | Age: 75
End: 2020-05-12

## 2020-05-12 NOTE — TELEPHONE ENCOUNTER
----- Message from Yoni Giles MD sent at 5/11/2020  4:58 PM EDT -----  The patient tried to donate blood but he was declined due to recent history of prostate cancer.  Please schedule him to come to our office for a CBC and phlebotomy within 1 week.  His follow-up appointment with me is on 6/30/2020.  Please make sure it has possible phlebotomy    Thank you      ----- Message -----  From: Mally Shelton RN  Sent: 5/11/2020  12:53 PM EDT  To: MD Dr. Chan He, I called him and he refused to talk to me.  I asked him 3 times if there was a message I could take and a question I could ask and he said no, he can call me tomorrow if he can't call me today.  Let me know how you would like for me to handle this or if you want to call him?      Patient would like for Dr. Giles to call him.    He declined to say what it was concerning.  He said Dr. Giles would know.     [phone]

## 2020-05-14 ENCOUNTER — INFUSION (OUTPATIENT)
Dept: ONCOLOGY | Facility: HOSPITAL | Age: 75
End: 2020-05-14

## 2020-05-14 ENCOUNTER — LAB (OUTPATIENT)
Dept: LAB | Facility: HOSPITAL | Age: 75
End: 2020-05-14

## 2020-05-14 VITALS
SYSTOLIC BLOOD PRESSURE: 180 MMHG | WEIGHT: 145 LBS | OXYGEN SATURATION: 97 % | TEMPERATURE: 98.1 F | DIASTOLIC BLOOD PRESSURE: 90 MMHG | BODY MASS INDEX: 20.81 KG/M2 | HEART RATE: 79 BPM

## 2020-05-14 DIAGNOSIS — D75.1 SECONDARY POLYCYTHEMIA: Primary | ICD-10-CM

## 2020-05-14 DIAGNOSIS — Z12.11 SCREENING FOR MALIGNANT NEOPLASM OF COLON: Primary | ICD-10-CM

## 2020-05-14 DIAGNOSIS — D75.1 ERYTHROCYTOSIS: ICD-10-CM

## 2020-05-14 LAB
BASOPHILS # BLD AUTO: 0.02 10*3/MM3 (ref 0–0.2)
BASOPHILS NFR BLD AUTO: 0.3 % (ref 0–1.5)
DEPRECATED RDW RBC AUTO: 42.5 FL (ref 37–54)
EOSINOPHIL # BLD AUTO: 0.24 10*3/MM3 (ref 0–0.4)
EOSINOPHIL NFR BLD AUTO: 4 % (ref 0.3–6.2)
ERYTHROCYTE [DISTWIDTH] IN BLOOD BY AUTOMATED COUNT: 12.4 % (ref 12.3–15.4)
HCT VFR BLD AUTO: 52.5 % (ref 37.5–51)
HGB BLD-MCNC: 18.3 G/DL (ref 13–17.7)
IMM GRANULOCYTES # BLD AUTO: 0.03 10*3/MM3 (ref 0–0.05)
IMM GRANULOCYTES NFR BLD AUTO: 0.5 % (ref 0–0.5)
LYMPHOCYTES # BLD AUTO: 1.51 10*3/MM3 (ref 0.7–3.1)
LYMPHOCYTES NFR BLD AUTO: 25 % (ref 19.6–45.3)
MCH RBC QN AUTO: 32.2 PG (ref 26.6–33)
MCHC RBC AUTO-ENTMCNC: 34.9 G/DL (ref 31.5–35.7)
MCV RBC AUTO: 92.4 FL (ref 79–97)
MONOCYTES # BLD AUTO: 0.47 10*3/MM3 (ref 0.1–0.9)
MONOCYTES NFR BLD AUTO: 7.8 % (ref 5–12)
NEUTROPHILS # BLD AUTO: 3.76 10*3/MM3 (ref 1.7–7)
NEUTROPHILS NFR BLD AUTO: 62.4 % (ref 42.7–76)
NRBC BLD AUTO-RTO: 0 /100 WBC (ref 0–0.2)
PLATELET # BLD AUTO: 229 10*3/MM3 (ref 140–450)
PMV BLD AUTO: 10.2 FL (ref 6–12)
RBC # BLD AUTO: 5.68 10*6/MM3 (ref 4.14–5.8)
WBC NRBC COR # BLD: 6.03 10*3/MM3 (ref 3.4–10.8)

## 2020-05-14 PROCEDURE — 36415 COLL VENOUS BLD VENIPUNCTURE: CPT

## 2020-05-14 PROCEDURE — 85025 COMPLETE CBC W/AUTO DIFF WBC: CPT

## 2020-05-14 PROCEDURE — 99195 PHLEBOTOMY: CPT

## 2020-05-14 RX ORDER — TIOTROPIUM BROMIDE 18 UG/1
CAPSULE ORAL; RESPIRATORY (INHALATION)
COMMUNITY
Start: 2020-04-25

## 2020-05-14 RX ORDER — CLONIDINE HYDROCHLORIDE 0.1 MG/1
0.1 TABLET ORAL ONCE
Status: COMPLETED | OUTPATIENT
Start: 2020-05-14 | End: 2020-05-14

## 2020-05-14 RX ORDER — SODIUM CHLORIDE 9 MG/ML
250 INJECTION, SOLUTION INTRAVENOUS ONCE
Status: DISCONTINUED | OUTPATIENT
Start: 2020-05-14 | End: 2020-05-14 | Stop reason: HOSPADM

## 2020-05-14 RX ORDER — SODIUM CHLORIDE 9 MG/ML
250 INJECTION, SOLUTION INTRAVENOUS ONCE
OUTPATIENT
Start: 2020-05-21

## 2020-05-14 RX ADMIN — CLONIDINE HYDROCHLORIDE 0.1 MG: 0.1 TABLET ORAL at 10:35

## 2020-05-14 NOTE — NURSING NOTE
Pt here today for phlebotomy.  Pt extremely anxious.  B/P 218/122.  Pt allowed to rest in chair.  Explained procedure to patient.  Pt reports taking B/P medication this AM but missed the past 2 days taking it.  Denies headache or any vision changes.    1000:  B/P 197/120.  Discussed with Dr. Giles and order received for Clonidine 0.1 mg po.  Pt given medication.  IV started at RAC.  100 cc removed.  Unable to remove anymore from that site.  IV started at LAC.  Again, 100 cc removed.  Unable to remove anymore from this site.  Pt refused to be stuck again.  Discussed with Dr. Giles and pt to return on Monday to remove 300 cc, for a total of 500 cc.   Pt v/u.  Message sent to scheduling to schedule for Monday and to call pt with new appointment.  B/P checked manually prior to discharge.  B/P 180/90.  Reports no headache or vision changes.  Pt discharge home ambulating.

## 2020-05-18 ENCOUNTER — INFUSION (OUTPATIENT)
Dept: ONCOLOGY | Facility: HOSPITAL | Age: 75
End: 2020-05-18

## 2020-05-18 ENCOUNTER — LAB (OUTPATIENT)
Dept: LAB | Facility: HOSPITAL | Age: 75
End: 2020-05-18

## 2020-05-18 VITALS
WEIGHT: 144.2 LBS | HEART RATE: 69 BPM | SYSTOLIC BLOOD PRESSURE: 161 MMHG | TEMPERATURE: 98 F | BODY MASS INDEX: 20.69 KG/M2 | DIASTOLIC BLOOD PRESSURE: 84 MMHG

## 2020-05-18 DIAGNOSIS — D75.1 SECONDARY POLYCYTHEMIA: ICD-10-CM

## 2020-05-18 DIAGNOSIS — D75.1 SECONDARY POLYCYTHEMIA: Primary | ICD-10-CM

## 2020-05-18 LAB
BASOPHILS # BLD AUTO: 0.03 10*3/MM3 (ref 0–0.2)
BASOPHILS NFR BLD AUTO: 0.6 % (ref 0–1.5)
DEPRECATED RDW RBC AUTO: 42.1 FL (ref 37–54)
EOSINOPHIL # BLD AUTO: 0.13 10*3/MM3 (ref 0–0.4)
EOSINOPHIL NFR BLD AUTO: 2.4 % (ref 0.3–6.2)
ERYTHROCYTE [DISTWIDTH] IN BLOOD BY AUTOMATED COUNT: 12.4 % (ref 12.3–15.4)
HCT VFR BLD AUTO: 49 % (ref 37.5–51)
HGB BLD-MCNC: 16.9 G/DL (ref 13–17.7)
IMM GRANULOCYTES # BLD AUTO: 0.03 10*3/MM3 (ref 0–0.05)
IMM GRANULOCYTES NFR BLD AUTO: 0.6 % (ref 0–0.5)
LYMPHOCYTES # BLD AUTO: 1.53 10*3/MM3 (ref 0.7–3.1)
LYMPHOCYTES NFR BLD AUTO: 28.5 % (ref 19.6–45.3)
MCH RBC QN AUTO: 32 PG (ref 26.6–33)
MCHC RBC AUTO-ENTMCNC: 34.5 G/DL (ref 31.5–35.7)
MCV RBC AUTO: 92.8 FL (ref 79–97)
MONOCYTES # BLD AUTO: 0.42 10*3/MM3 (ref 0.1–0.9)
MONOCYTES NFR BLD AUTO: 7.8 % (ref 5–12)
NEUTROPHILS # BLD AUTO: 3.22 10*3/MM3 (ref 1.7–7)
NEUTROPHILS NFR BLD AUTO: 60.1 % (ref 42.7–76)
NRBC BLD AUTO-RTO: 0 /100 WBC (ref 0–0.2)
PLATELET # BLD AUTO: 214 10*3/MM3 (ref 140–450)
PMV BLD AUTO: 10.1 FL (ref 6–12)
RBC # BLD AUTO: 5.28 10*6/MM3 (ref 4.14–5.8)
WBC NRBC COR # BLD: 5.36 10*3/MM3 (ref 3.4–10.8)

## 2020-05-18 PROCEDURE — 85025 COMPLETE CBC W/AUTO DIFF WBC: CPT

## 2020-05-18 PROCEDURE — 99195 PHLEBOTOMY: CPT

## 2020-05-18 PROCEDURE — 36416 COLLJ CAPILLARY BLOOD SPEC: CPT

## 2020-06-30 ENCOUNTER — APPOINTMENT (OUTPATIENT)
Dept: LAB | Facility: HOSPITAL | Age: 75
End: 2020-06-30

## 2020-06-30 ENCOUNTER — TELEPHONE (OUTPATIENT)
Dept: GENERAL RADIOLOGY | Facility: HOSPITAL | Age: 75
End: 2020-06-30

## 2020-06-30 ENCOUNTER — APPOINTMENT (OUTPATIENT)
Dept: ONCOLOGY | Facility: HOSPITAL | Age: 75
End: 2020-06-30

## 2020-06-30 NOTE — TELEPHONE ENCOUNTER
----- Message from Destiney Cervantes MA sent at 6/29/2020  2:03 PM EDT -----  Regarding: reschedualbrian Resendiz.     Grzegorz needs to cancel tomorrow and would like for you to call him tomorrow to see when he can come.

## 2020-08-03 RX ORDER — TRIAMTERENE AND HYDROCHLOROTHIAZIDE 37.5; 25 MG/1; MG/1
CAPSULE ORAL
Qty: 90 CAPSULE | Refills: 2 | Status: SHIPPED | OUTPATIENT
Start: 2020-08-03 | End: 2021-05-17 | Stop reason: SDUPTHER

## 2020-09-21 ENCOUNTER — OFFICE VISIT (OUTPATIENT)
Dept: FAMILY MEDICINE CLINIC | Facility: CLINIC | Age: 75
End: 2020-09-21

## 2020-09-21 VITALS
OXYGEN SATURATION: 98 % | HEIGHT: 67 IN | WEIGHT: 147.4 LBS | SYSTOLIC BLOOD PRESSURE: 164 MMHG | DIASTOLIC BLOOD PRESSURE: 82 MMHG | HEART RATE: 64 BPM | TEMPERATURE: 98.2 F | BODY MASS INDEX: 23.13 KG/M2

## 2020-09-21 DIAGNOSIS — I10 ESSENTIAL HYPERTENSION: Primary | ICD-10-CM

## 2020-09-21 LAB
ALBUMIN SERPL-MCNC: 4.4 G/DL (ref 3.5–5.2)
ALBUMIN/GLOB SERPL: 1.6 G/DL
ALP SERPL-CCNC: 54 U/L (ref 39–117)
ALT SERPL W P-5'-P-CCNC: 21 U/L (ref 1–41)
ANION GAP SERPL CALCULATED.3IONS-SCNC: 10.5 MMOL/L (ref 5–15)
AST SERPL-CCNC: 23 U/L (ref 1–40)
BILIRUB SERPL-MCNC: 0.5 MG/DL (ref 0–1.2)
BUN SERPL-MCNC: 18 MG/DL (ref 8–23)
BUN/CREAT SERPL: 12.9 (ref 7–25)
CALCIUM SPEC-SCNC: 9.5 MG/DL (ref 8.6–10.5)
CHLORIDE SERPL-SCNC: 100 MMOL/L (ref 98–107)
CO2 SERPL-SCNC: 29.5 MMOL/L (ref 22–29)
CREAT SERPL-MCNC: 1.4 MG/DL (ref 0.76–1.27)
ERYTHROCYTE [DISTWIDTH] IN BLOOD BY AUTOMATED COUNT: 12.8 % (ref 12.3–15.4)
GFR SERPL CREATININE-BSD FRML MDRD: 50 ML/MIN/1.73
GLOBULIN UR ELPH-MCNC: 2.7 GM/DL
GLUCOSE SERPL-MCNC: 61 MG/DL (ref 65–99)
HCT VFR BLD AUTO: 56.2 % (ref 37.5–51)
HGB BLD-MCNC: 18 G/DL (ref 13–17.7)
LYMPHOCYTES # BLD AUTO: 1.9 10*3/MM3 (ref 0.7–3.1)
LYMPHOCYTES NFR BLD AUTO: 25.5 % (ref 19.6–45.3)
MCH RBC QN AUTO: 31.1 PG (ref 26.6–33)
MCHC RBC AUTO-ENTMCNC: 32 G/DL (ref 31.5–35.7)
MCV RBC AUTO: 96.9 FL (ref 79–97)
MONOCYTES # BLD AUTO: 0.2 10*3/MM3 (ref 0.1–0.9)
MONOCYTES NFR BLD AUTO: 3.4 % (ref 5–12)
NEUTROPHILS NFR BLD AUTO: 5.2 10*3/MM3 (ref 1.7–7)
NEUTROPHILS NFR BLD AUTO: 71.1 % (ref 42.7–76)
PLATELET # BLD AUTO: 225 10*3/MM3 (ref 140–450)
PMV BLD AUTO: 8 FL (ref 6–12)
POTASSIUM SERPL-SCNC: 4 MMOL/L (ref 3.5–5.2)
PROT SERPL-MCNC: 7.1 G/DL (ref 6–8.5)
RBC # BLD AUTO: 5.8 10*6/MM3 (ref 4.14–5.8)
SODIUM SERPL-SCNC: 140 MMOL/L (ref 136–145)
TSH SERPL DL<=0.05 MIU/L-ACNC: 2.02 UIU/ML (ref 0.27–4.2)
WBC # BLD AUTO: 7.3 10*3/MM3 (ref 3.4–10.8)

## 2020-09-21 PROCEDURE — 36415 COLL VENOUS BLD VENIPUNCTURE: CPT | Performed by: NURSE PRACTITIONER

## 2020-09-21 PROCEDURE — 84443 ASSAY THYROID STIM HORMONE: CPT | Performed by: NURSE PRACTITIONER

## 2020-09-21 PROCEDURE — 80053 COMPREHEN METABOLIC PANEL: CPT | Performed by: NURSE PRACTITIONER

## 2020-09-21 PROCEDURE — 85025 COMPLETE CBC W/AUTO DIFF WBC: CPT | Performed by: NURSE PRACTITIONER

## 2020-09-21 PROCEDURE — 99213 OFFICE O/P EST LOW 20 MIN: CPT | Performed by: NURSE PRACTITIONER

## 2020-09-21 RX ORDER — AMLODIPINE BESYLATE 10 MG/1
10 TABLET ORAL DAILY
Qty: 30 TABLET | Refills: 3 | Status: SHIPPED | OUTPATIENT
Start: 2020-09-21 | End: 2021-02-15

## 2020-09-21 NOTE — PATIENT INSTRUCTIONS
Increase amlodipine to 10mg daily, cont dyazide, monitor BP at home,   He will call with BP readings in about 2 weeks,   Low salt diet, decrease stress.   If BP >180/90 advised ER.   Increase fluid intake, get plenty of rest.   Patient agrees with plan of care and understands instructions. Call if worsening symptoms or any problems or concerns.

## 2020-09-21 NOTE — PROGRESS NOTES
Subjective   Grzegorz Price is a 74 y.o. male.     History of Present Illness   C/o HTN, taking amlodipine 5mg daily, triamterene-hctz 37.5-25mg daily, states he has not been taking  amlodipine daily. He states he went to donate blood 180/90, he states he has been checking BP since then 163/83, states he did take amlodipine at 1:00 today. Denies CP,SOA, HA, LE edema, dizziness. He is not fasting today.     The following portions of the patient's history were reviewed and updated as appropriate: allergies, current medications, past family history, past medical history, past social history, past surgical history and problem list.    Review of Systems   Constitutional: Negative for chills, diaphoresis and fever.   Respiratory: Negative for cough and shortness of breath.    Cardiovascular: Negative for chest pain.   Musculoskeletal: Negative for arthralgias and myalgias.   Neurological: Negative for dizziness, light-headedness and headache.   All other systems reviewed and are negative.      Objective   Physical Exam  Vitals signs and nursing note reviewed.   Constitutional:       Appearance: He is well-developed.   HENT:      Head: Normocephalic.   Eyes:      Pupils: Pupils are equal, round, and reactive to light.   Cardiovascular:      Rate and Rhythm: Normal rate and regular rhythm.      Heart sounds: Normal heart sounds.   Pulmonary:      Effort: Pulmonary effort is normal.      Breath sounds: Normal breath sounds.   Skin:     General: Skin is warm and dry.   Neurological:      Mental Status: He is alert and oriented to person, place, and time.   Psychiatric:         Behavior: Behavior normal.         Judgment: Judgment normal.           Assessment/Plan   Grzegorz was seen today for hypertension.    Diagnoses and all orders for this visit:    Essential hypertension  -     CBC & Differential  -     Comprehensive Metabolic Panel  -     TSH  -     CBC Auto Differential    Other orders  -     amLODIPine (NORVASC) 10 MG  tablet; Take 1 tablet by mouth Daily.          Increase amlodipine to 10mg daily, cont dyazide, monitor BP at home,   He will call with BP readings in about 2 weeks,   Low salt diet, decrease stress.   If BP >180/90 advised ER.   Increase fluid intake, get plenty of rest.   Patient agrees with plan of care and understands instructions. Call if worsening symptoms or any problems or concerns.

## 2020-09-23 DIAGNOSIS — R79.89 ELEVATED SERUM CREATININE: Primary | ICD-10-CM

## 2020-09-23 DIAGNOSIS — R79.89 ABNORMAL CBC: Primary | ICD-10-CM

## 2020-10-09 ENCOUNTER — TELEPHONE (OUTPATIENT)
Dept: FAMILY MEDICINE CLINIC | Facility: CLINIC | Age: 75
End: 2020-10-09

## 2020-10-09 DIAGNOSIS — R79.89 ELEVATED SERUM CREATININE: Primary | ICD-10-CM

## 2020-10-09 NOTE — TELEPHONE ENCOUNTER
PT IS REQUESTING A CALL BACK FROM MO SHELTON, HE STATED SHE WOULD KNOW WHAT IS WAS PERTAINING TO.     CALL BACK [phone]

## 2020-10-16 ENCOUNTER — LAB (OUTPATIENT)
Dept: FAMILY MEDICINE CLINIC | Facility: CLINIC | Age: 75
End: 2020-10-16

## 2020-10-16 DIAGNOSIS — R79.89 ELEVATED SERUM CREATININE: ICD-10-CM

## 2020-10-16 DIAGNOSIS — R79.89 ABNORMAL CBC: ICD-10-CM

## 2020-10-16 LAB
ANION GAP SERPL CALCULATED.3IONS-SCNC: 8.9 MMOL/L (ref 5–15)
BUN SERPL-MCNC: 24 MG/DL (ref 8–23)
BUN/CREAT SERPL: 18.8 (ref 7–25)
CALCIUM SPEC-SCNC: 9.6 MG/DL (ref 8.6–10.5)
CHLORIDE SERPL-SCNC: 96 MMOL/L (ref 98–107)
CO2 SERPL-SCNC: 32.1 MMOL/L (ref 22–29)
CREAT SERPL-MCNC: 1.28 MG/DL (ref 0.76–1.27)
ERYTHROCYTE [DISTWIDTH] IN BLOOD BY AUTOMATED COUNT: 13.9 % (ref 12.3–15.4)
GFR SERPL CREATININE-BSD FRML MDRD: 55 ML/MIN/1.73
GLUCOSE SERPL-MCNC: 75 MG/DL (ref 65–99)
HCT VFR BLD AUTO: 49.7 % (ref 37.5–51)
HGB BLD-MCNC: 16.1 G/DL (ref 13–17.7)
LYMPHOCYTES # BLD AUTO: 2.1 10*3/MM3 (ref 0.7–3.1)
LYMPHOCYTES NFR BLD AUTO: 20.1 % (ref 19.6–45.3)
MCH RBC QN AUTO: 30.7 PG (ref 26.6–33)
MCHC RBC AUTO-ENTMCNC: 32.3 G/DL (ref 31.5–35.7)
MCV RBC AUTO: 95 FL (ref 79–97)
MONOCYTES # BLD AUTO: 0.4 10*3/MM3 (ref 0.1–0.9)
MONOCYTES NFR BLD AUTO: 3.5 % (ref 5–12)
NEUTROPHILS NFR BLD AUTO: 7.9 10*3/MM3 (ref 1.7–7)
NEUTROPHILS NFR BLD AUTO: 76.4 % (ref 42.7–76)
PLATELET # BLD AUTO: 278 10*3/MM3 (ref 140–450)
PMV BLD AUTO: 7.8 FL (ref 6–12)
POTASSIUM SERPL-SCNC: 3.9 MMOL/L (ref 3.5–5.2)
RBC # BLD AUTO: 5.23 10*6/MM3 (ref 4.14–5.8)
SODIUM SERPL-SCNC: 137 MMOL/L (ref 136–145)
WBC # BLD AUTO: 10.3 10*3/MM3 (ref 3.4–10.8)

## 2020-10-16 PROCEDURE — 85025 COMPLETE CBC W/AUTO DIFF WBC: CPT | Performed by: NURSE PRACTITIONER

## 2020-10-16 PROCEDURE — 80048 BASIC METABOLIC PNL TOTAL CA: CPT | Performed by: NURSE PRACTITIONER

## 2020-10-16 PROCEDURE — 36415 COLL VENOUS BLD VENIPUNCTURE: CPT | Performed by: NURSE PRACTITIONER

## 2020-10-19 ENCOUNTER — DOCUMENTATION (OUTPATIENT)
Dept: FAMILY MEDICINE CLINIC | Facility: CLINIC | Age: 75
End: 2020-10-19

## 2020-10-19 NOTE — PROGRESS NOTES
Spoke with patient, reviewed recent kidney func labs, improved. Pending nephrology consult, he would like to monitor labs for now, discussed avoiding nsaids, increasing fluids, can consider changing dyazide if needed. Recheck in about 2-3 months.

## 2021-01-25 ENCOUNTER — TELEPHONE (OUTPATIENT)
Dept: FAMILY MEDICINE CLINIC | Facility: CLINIC | Age: 76
End: 2021-01-25

## 2021-01-25 NOTE — TELEPHONE ENCOUNTER
Caller: Grzegorz Price    Relationship to patient: Self    Best call back number: [phone] Chief complaint:STOMACH PAIN ONCE A WEEK   Type of visit: NEW PATIENT (REESTABLISH    Requested date:WEEK OF 1/25/2021    If rescheduling, when is the original appointment:3/5/2021    Additional notes:    MR. JACKSON SAYS HE IS HAVING GALL BLADDER PAIN THAT HAS BEEN   GOING ON FOR 5-6 WEEKS AGO. SAYS THAT THE PAIN COMES AND GOES DEPENDING ON WHAT HE EATS. THE EARLIEST I CAN SCHEDULE WAS 3/5/2021 WITH MO SHELTON (WANTS TO ESTABLISH WITH MO SHELTON)     PLEASE ADVISE

## 2021-02-04 ENCOUNTER — OFFICE VISIT (OUTPATIENT)
Dept: FAMILY MEDICINE CLINIC | Facility: CLINIC | Age: 76
End: 2021-02-04

## 2021-02-04 VITALS
BODY MASS INDEX: 23.89 KG/M2 | SYSTOLIC BLOOD PRESSURE: 140 MMHG | HEART RATE: 58 BPM | DIASTOLIC BLOOD PRESSURE: 76 MMHG | TEMPERATURE: 97.3 F | WEIGHT: 152.2 LBS | OXYGEN SATURATION: 93 % | HEIGHT: 67 IN

## 2021-02-04 DIAGNOSIS — R10.13 EPIGASTRIC PAIN: Primary | ICD-10-CM

## 2021-02-04 DIAGNOSIS — K21.9 GASTROESOPHAGEAL REFLUX DISEASE, UNSPECIFIED WHETHER ESOPHAGITIS PRESENT: ICD-10-CM

## 2021-02-04 LAB
BACTERIA UR QL AUTO: NORMAL /HPF
BILIRUB UR QL STRIP: NEGATIVE
CLARITY UR: CLEAR
COLOR UR: YELLOW
GLUCOSE UR STRIP-MCNC: NEGATIVE MG/DL
HGB UR QL STRIP.AUTO: NEGATIVE
KETONES UR QL STRIP: NEGATIVE
LEUKOCYTE ESTERASE UR QL STRIP.AUTO: NEGATIVE
NITRITE UR QL STRIP: NEGATIVE
PH UR STRIP.AUTO: 7.5 [PH] (ref 4.6–8)
PROT UR QL STRIP: NEGATIVE
RBC # UR: NORMAL /HPF
REF LAB TEST METHOD: NORMAL
SP GR UR STRIP: 1.01 (ref 1–1.03)
SQUAMOUS #/AREA URNS HPF: NORMAL /HPF
UROBILINOGEN UR QL STRIP: NORMAL
WBC UR QL AUTO: NORMAL /HPF

## 2021-02-04 PROCEDURE — 99213 OFFICE O/P EST LOW 20 MIN: CPT | Performed by: NURSE PRACTITIONER

## 2021-02-04 PROCEDURE — 81001 URINALYSIS AUTO W/SCOPE: CPT | Performed by: NURSE PRACTITIONER

## 2021-02-04 RX ORDER — OMEPRAZOLE 20 MG/1
20 CAPSULE, DELAYED RELEASE ORAL DAILY
Qty: 30 CAPSULE | Refills: 0 | Status: SHIPPED | OUTPATIENT
Start: 2021-02-04 | End: 2021-03-29

## 2021-02-04 NOTE — PATIENT INSTRUCTIONS
Food Choices for Gastroesophageal Reflux Disease, Adult  When you have gastroesophageal reflux disease (GERD), the foods you eat and your eating habits are very important. Choosing the right foods can help ease your discomfort. Think about working with a nutrition specialist (dietitian) to help you make good choices.  What are tips for following this plan?    Meals  · Choose healthy foods that are low in fat, such as fruits, vegetables, whole grains, low-fat dairy products, and lean meat, fish, and poultry.  · Eat small meals often instead of 3 large meals a day. Eat your meals slowly, and in a place where you are relaxed. Avoid bending over or lying down until 2-3 hours after eating.  · Avoid eating meals 2-3 hours before bed.  · Avoid drinking a lot of liquid with meals.  · Cook foods using methods other than frying. Bake, grill, or broil food instead.  · Avoid or limit:  ? Chocolate.  ? Peppermint or spearmint.  ? Alcohol.  ? Pepper.  ? Black and decaffeinated coffee.  ? Black and decaffeinated tea.  ? Bubbly (carbonated) soft drinks.  ? Caffeinated energy drinks and soft drinks.  · Limit high-fat foods such as:  ? Fatty meat or fried foods.  ? Whole milk, cream, butter, or ice cream.  ? Nuts and nut butters.  ? Pastries, donuts, and sweets made with butter or shortening.  · Avoid foods that cause symptoms. These foods may be different for everyone. Common foods that cause symptoms include:  ? Tomatoes.  ? Oranges, carlos, and limes.  ? Peppers.  ? Spicy food.  ? Onions and garlic.  ? Vinegar.  Lifestyle  · Maintain a healthy weight. Ask your doctor what weight is healthy for you. If you need to lose weight, work with your doctor to do so safely.  · Exercise for at least 30 minutes for 5 or more days each week, or as told by your doctor.  · Wear loose-fitting clothes.  · Do not smoke. If you need help quitting, ask your doctor.  · Sleep with the head of your bed higher than your feet. Use a wedge under the  mattress or blocks under the bed frame to raise the head of the bed.  Summary  · When you have gastroesophageal reflux disease (GERD), food and lifestyle choices are very important in easing your symptoms.  · Eat small meals often instead of 3 large meals a day. Eat your meals slowly, and in a place where you are relaxed.  · Limit high-fat foods such as fatty meat or fried foods.  · Avoid bending over or lying down until 2-3 hours after eating.  · Avoid peppermint and spearmint, caffeine, alcohol, and chocolate.  This information is not intended to replace advice given to you by your health care provider. Make sure you discuss any questions you have with your health care provider.  Document Revised: 04/09/2020 Document Reviewed: 01/23/2018  Elsevier Patient Education © 2020 [employer]    Advance Care Planning and Advance Directives     You make decisions on a daily basis - decisions about where you want to live, your career, your home, your life. Perhaps one of the most important decisions you face is your choice for future medical care. Take time to talk with your family and your healthcare team and start planning today.  Advance Care Planning is a process that can help you:  · Understand possible future healthcare decisions in light of your own experiences  · Reflect on those decision in light of your goals and values  · Discuss your decisions with those closest to you and the healthcare professionals that care for you  · Make a plan by creating a document that reflects your wishes    Surrogate Decision Maker  In the event of a medical emergency, which has left you unable to communicate or to make your own decisions, you would need someone to make decisions for you.  It is important to discuss your preferences for medical treatment with this person while you are in good health.     Qualities of a surrogate decision maker:  • Willing to take on this role and responsibility  • Knows what you want for future  medical care  • Willing to follow your wishes even if they don't agree with them  • Able to make difficult medical decisions under stressful circumstances    Advance Directives  These are legal documents you can create that will guide your healthcare team and decision maker(s) when needed. These documents can be stored in the electronic medical record.    · Living Will - a legal document to guide your care if you have a terminal condition or a serious illness and are unable to communicate. States vary by statute in document names/types, but most forms may include one or more of the following:        -  Directions regarding life-prolonging treatments        -  Directions regarding artificially provided nutrition/hydration        -  Choosing a healthcare decision maker        -  Direction regarding organ/tissue donation    · Durable Power of  for Healthcare - this document names an -in-fact to make medical decisions for you, but it may also allow this person to make personal and financial decisions for you. Please seek the advice of an  if you need this type of document.    **Advance Directives are not required and no one may discriminate against you if you do not sign one.    Medical Orders  Many states allow specific forms/orders signed by your physician to record your wishes for medical treatment in your current state of health. This form, signed in personal communication with your physician, addresses resuscitation and other medical interventions that you may or may not want.      For more information or to schedule a time with a [facility] Advance Care Planning Facilitator contact: [email or web address] or call [phone] and someone will contact you directly.      UA today,   Trial omeprazole 20mg daily.   Low acid diet, handout given.   If symptoms persist call office,   If any worsening symptoms, abd pain advised ER.   Patient agrees with plan of care and understands instructions.  Call if worsening symptoms or any problems or concerns.

## 2021-02-04 NOTE — PROGRESS NOTES
"Chief Complaint  stomach pain 4 weeks (worse after popcorn)    Subjective          Grzegorz Price presents to [facility] for   History of Present Illness  C/o abd pain, states worse after eating popcorn, states symptoms started about 1 month ago, states he has burning sensation, tried antiacid them improves, worse when lying down. He does not take gerd meds regularly, denies fever, denies nausea, vomiting, diarrhea. He tried prilosec OTC which helped symptoms. States pain epigastric region. Denies urinary symptoms today.       Objective   Vital Signs:   /76   Pulse 58   Temp 97.3 °F (36.3 °C)   Ht 170.2 cm (67\")   Wt 69 kg (152 lb 3.2 oz)   SpO2 93%   BMI 23.84 kg/m²     Physical Exam  Vitals signs and nursing note reviewed.   Constitutional:       Appearance: He is well-developed.   HENT:      Head: Normocephalic.   Eyes:      Pupils: Pupils are equal, round, and reactive to light.   Cardiovascular:      Rate and Rhythm: Normal rate and regular rhythm.      Heart sounds: Normal heart sounds.   Pulmonary:      Effort: Pulmonary effort is normal.      Breath sounds: Normal breath sounds.   Abdominal:      General: Abdomen is flat. Bowel sounds are normal.      Palpations: Abdomen is soft.      Tenderness: There is no abdominal tenderness.   Skin:     General: Skin is warm and dry.   Neurological:      Mental Status: He is alert and oriented to person, place, and time.   Psychiatric:         Behavior: Behavior normal.         Judgment: Judgment normal.        Result Review :                 Assessment and Plan    Problem List Items Addressed This Visit     None      Visit Diagnoses     Epigastric pain    -  Primary    Relevant Orders    Urinalysis With Microscopic - Urine, Clean Catch (Completed)    Urinalysis without microscopic (no culture) - Urine, Clean Catch (Completed)    Urinalysis, Microscopic Only - Urine, Clean Catch (Completed)    Gastroesophageal reflux " disease, unspecified whether esophagitis present        Relevant Medications    omeprazole (PrilOSEC) 20 MG capsule          Follow Up   Return if symptoms worsen or fail to improve.  Patient was given instructions and counseling regarding his condition or for health maintenance advice. Please see specific information pulled into the AVS if appropriate.     UA today,   Trial omeprazole 20mg daily for 2-3 weeks.   Low acid diet, handout given.   If symptoms persist call office,   If any worsening symptoms, abd pain advised ER.   Patient agrees with plan of care and understands instructions. Call if worsening symptoms or any problems or concerns.

## 2021-02-15 RX ORDER — AMLODIPINE BESYLATE 10 MG/1
TABLET ORAL
Qty: 30 TABLET | Refills: 2 | Status: SHIPPED | OUTPATIENT
Start: 2021-02-15 | End: 2021-05-25

## 2021-03-02 DIAGNOSIS — Z23 IMMUNIZATION DUE: ICD-10-CM

## 2021-03-29 RX ORDER — OMEPRAZOLE 20 MG/1
CAPSULE, DELAYED RELEASE ORAL
Qty: 30 CAPSULE | Refills: 0 | Status: SHIPPED | OUTPATIENT
Start: 2021-03-29 | End: 2022-05-26

## 2021-05-17 RX ORDER — TRIAMTERENE AND HYDROCHLOROTHIAZIDE 37.5; 25 MG/1; MG/1
1 CAPSULE ORAL EVERY MORNING
Qty: 90 CAPSULE | Refills: 1 | Status: SHIPPED | OUTPATIENT
Start: 2021-05-17 | End: 2021-11-22 | Stop reason: SDUPTHER

## 2021-05-25 RX ORDER — AMLODIPINE BESYLATE 10 MG/1
TABLET ORAL
Qty: 30 TABLET | Refills: 1 | Status: SHIPPED | OUTPATIENT
Start: 2021-05-25 | End: 2021-08-03 | Stop reason: SDUPTHER

## 2021-08-03 RX ORDER — AMLODIPINE BESYLATE 10 MG/1
10 TABLET ORAL DAILY
Qty: 30 TABLET | Refills: 1 | Status: SHIPPED | OUTPATIENT
Start: 2021-08-03 | End: 2021-08-10 | Stop reason: SDUPTHER

## 2021-08-03 NOTE — TELEPHONE ENCOUNTER
PATIENT ALSO MENTIONED TO GET REFILLS OF LOSARTAN (NOT ON MED LIST)    Caller: Grzegorz Price    Relationship: Self    Best call back number: [phone]    Medication needed:   Requested Prescriptions     Pending Prescriptions Disp Refills   • amLODIPine (NORVASC) 10 MG tablet 30 tablet 1     Sig: Take 1 tablet by mouth Daily.     What is the patient's preferred pharmacy: [facility] [address] AT [facility] [phone] [facility] [phone] FX

## 2021-08-10 RX ORDER — AMLODIPINE BESYLATE 10 MG/1
10 TABLET ORAL DAILY
Qty: 30 TABLET | Refills: 1 | Status: SHIPPED | OUTPATIENT
Start: 2021-08-10 | End: 2021-09-13 | Stop reason: SDUPTHER

## 2021-09-13 RX ORDER — AMLODIPINE BESYLATE 10 MG/1
10 TABLET ORAL DAILY
Qty: 90 TABLET | Refills: 1 | Status: SHIPPED | OUTPATIENT
Start: 2021-09-13 | End: 2022-07-01

## 2021-10-13 ENCOUNTER — TELEPHONE (OUTPATIENT)
Dept: FAMILY MEDICINE CLINIC | Facility: CLINIC | Age: 76
End: 2021-10-13

## 2021-10-13 NOTE — TELEPHONE ENCOUNTER
He needs to be seen and evaluated.  I have no openings.  He should go to an urgent care center.  Even if he is vaccinated he can still get Covid.

## 2021-10-13 NOTE — TELEPHONE ENCOUNTER
Caller: Grzegorz Price    Relationship: Self    Best call back number: [phone]    What medication are you requesting: ANTIBIOTIC    What are your current symptoms: LOW GRADE FEVER, DRAINAGE    How long have you been experiencing symptoms: TWO WEEKS-FEVER ONLY IN LAST THREE DAYS    Have you had these symptoms before:    [] Yes  [x] No    Have you been treated for these symptoms before:   [] Yes  [x] No    If a prescription is needed, what is your preferred pharmacy and phone number: [facility] [address] [facility] AT [facility] [phone] [facility] [phone] FX     Additional notes: PATIENT CALLED REQUESTING MEDICATION, PLEASE ADVISE IF HE NEEDS AN APPOINTMENT FIRST.

## 2021-11-22 RX ORDER — TRIAMTERENE AND HYDROCHLOROTHIAZIDE 37.5; 25 MG/1; MG/1
1 CAPSULE ORAL EVERY MORNING
Qty: 90 CAPSULE | Refills: 1 | Status: SHIPPED | OUTPATIENT
Start: 2021-11-22 | End: 2022-05-18

## 2021-11-22 NOTE — TELEPHONE ENCOUNTER
Caller: Grzegorz Price    Relationship: Self    Best call back number: [phone]    Requested Prescriptions:   Requested Prescriptions     Pending Prescriptions Disp Refills   • triamterene-hydrochlorothiazide (DYAZIDE) 37.5-25 MG per capsule 90 capsule 1     Sig: Take 1 capsule by mouth Every Morning.        Pharmacy where request should be sent: [facility] [address] [place] [address] RD AT [facility] [phone] [facility] [phone] FX     Additional details provided by patient: PATIENT IS COMPLETELY OUT OF THIS MEDICATION.     Does the patient have less than a 3 day supply:  [x] Yes  [] No    Fortino Canales Rep   11/22/21 16:26 EST

## 2022-05-18 RX ORDER — TRIAMTERENE AND HYDROCHLOROTHIAZIDE 37.5; 25 MG/1; MG/1
CAPSULE ORAL
Qty: 90 CAPSULE | Refills: 1 | Status: SHIPPED | OUTPATIENT
Start: 2022-05-18 | End: 2022-11-11 | Stop reason: SDUPTHER

## 2022-05-26 ENCOUNTER — OFFICE VISIT (OUTPATIENT)
Dept: SURGERY | Facility: CLINIC | Age: 77
End: 2022-05-26

## 2022-05-26 VITALS
SYSTOLIC BLOOD PRESSURE: 112 MMHG | BODY MASS INDEX: 22.29 KG/M2 | DIASTOLIC BLOOD PRESSURE: 72 MMHG | HEART RATE: 72 BPM | HEIGHT: 67 IN | WEIGHT: 142 LBS | OXYGEN SATURATION: 99 %

## 2022-05-26 DIAGNOSIS — R91.8 GROUND GLASS OPACITY PRESENT ON IMAGING OF LUNG: Primary | ICD-10-CM

## 2022-05-26 PROCEDURE — 99202 OFFICE O/P NEW SF 15 MIN: CPT | Performed by: THORACIC SURGERY (CARDIOTHORACIC VASCULAR SURGERY)

## 2022-05-26 NOTE — PROGRESS NOTES
"Chief Complaint  Groundglass opacity on CT scan    Subjective          Grzegorz Price presents to [facility]  History of Present Illness     Mr. Malin is a 76-year-old  male who has a greater than 40-year history of smoking 1 pack of cigarettes per day.  He stopped smoking 8 to 9 years ago.  Because of his smoking history he gets yearly low-dose screening CT scans.  Current scan showed a 6 mm x 9 mm groundglass opacity in the right lower lobe of the lung.  He has been referred here for further evaluation of this nodule.    Patient has a history of COPD.  Was recently started on Trelegy and albuterol.  There is greatly decreased his shortness of breath.  He reports no wheezing.  He has no cough or hemoptysis.  He has no pleuritic pain.  He has no hoarseness or change in his voice.  He does have chronic sinusitis with postnasal drip that causes him to have a dry cough occasionally throughout the year.  He is active without any limitations.  He was diagnosed with prostate cancer 5 years ago but has no other cancer history.    Objective   Vital Signs:  /72 (BP Location: Right arm, Patient Position: Sitting, Cuff Size: Adult)   Pulse 72   Ht 170.2 cm (67\")   Wt 64.4 kg (142 lb)   SpO2 99%   BMI 22.24 kg/m²   BMI has not been calculated during today's encounter.       Physical Exam     Neck: There are no masses.  No palpable cervical or supraclavicular adenopathy.    Pulmonary: Lungs are clear to auscultation with equal breath sounds bilaterally    Cardiac: Regular rate and rhythm.  No murmurs or gallops.  No dependent edema.    Result Review :   The following data was reviewed by: Dakota Giordano III, MD on 05/26/2022:    CT scan of the chest performed April 29, 2022 was independently reviewed.  There are general changes of COPD and emphysema.  There are scattered areas of mild bronchiectasis.  There is a 6 x 9 mm groundglass opacity in the medial basilar segment " of the right lower lobe of the lung.  There is a calcified granuloma in the medial right upper lobe.  There is mild atelectasis in the lingula and right middle lobe.  There is no suspicious hilar or mediastinal adenopathy.  There are no pleural effusions.         Assessment and Plan    Diagnoses and all orders for this visit:    1. Ground glass opacity present on imaging of lung (Primary)  -     CT Chest Without Contrast; Future    Given the patient's history of chronic sinusitis with significant postnasal drainage this groundglass opacity certainly could be an inflammatory process.  Nothing else on the scan appears suspicious for malignancy.  Location of this area would make biopsy by either bronchoscopy or CT directed needle biopsy low yield and high risk.  I have recommended that we just follow this with serial CT scans.  I have asked the patient to return to see me in 6 months with a CT of the chest for further follow-up.  I will keep you informed of his progress.  Thank you for allowing me to participate in the care of Mr. Price.       I spent 21 minutes caring for Grzegorz on this date of service. This time includes time spent by me in the following activities:preparing for the visit, reviewing tests, obtaining and/or reviewing a separately obtained history, performing a medically appropriate examination and/or evaluation , counseling and educating the patient/family/caregiver, ordering medications, tests, or procedures, referring and communicating with other health care professionals , documenting information in the medical record, independently interpreting results and communicating that information with the patient/family/caregiver and care coordination  Follow Up   Return in about 6 months (around 11/26/2022) for Recheck.  Patient was given instructions and counseling regarding his condition or for health maintenance advice. Please see specific information pulled into the AVS if appropriate.

## 2022-07-01 RX ORDER — AMLODIPINE BESYLATE 10 MG/1
TABLET ORAL
Qty: 30 TABLET | Refills: 1 | Status: SHIPPED | OUTPATIENT
Start: 2022-07-01 | End: 2022-08-23 | Stop reason: SDUPTHER

## 2022-08-23 RX ORDER — AMLODIPINE BESYLATE 10 MG/1
10 TABLET ORAL DAILY
Qty: 30 TABLET | Refills: 0 | Status: SHIPPED | OUTPATIENT
Start: 2022-08-23 | End: 2022-09-30

## 2022-09-30 RX ORDER — AMLODIPINE BESYLATE 10 MG/1
10 TABLET ORAL DAILY
Qty: 15 TABLET | Refills: 0 | OUTPATIENT
Start: 2022-09-30 | End: 2022-11-11

## 2022-11-04 ENCOUNTER — TELEPHONE (OUTPATIENT)
Dept: SURGERY | Facility: CLINIC | Age: 77
End: 2022-11-04

## 2022-11-04 NOTE — TELEPHONE ENCOUNTER
Caller: Grzegorz Price    Relationship to patient: Self    Best call back number: [phone]    Type of visit: 6 MO CT CHEST    Requested date: PT HAS APPT AT 10:30 THAT DAY WITH DIFFERENT PRACTICE.    If rescheduling, when is the original appointment: 11.17.22    Additional notes: PT WILL BE OUT OF TOWN FROM TIMES NOV 22- NOV 28TH.

## 2022-11-09 ENCOUNTER — HOSPITAL ENCOUNTER (OUTPATIENT)
Dept: CT IMAGING | Facility: HOSPITAL | Age: 77
Discharge: HOME | End: 2022-11-09
Admitting: THORACIC SURGERY (CARDIOTHORACIC VASCULAR SURGERY)

## 2022-11-09 DIAGNOSIS — R91.8 GROUND GLASS OPACITY PRESENT ON IMAGING OF LUNG: ICD-10-CM

## 2022-11-09 PROCEDURE — 71250 CT THORAX DX C-: CPT

## 2022-11-23 ENCOUNTER — TELEPHONE (OUTPATIENT)
Dept: SURGERY | Facility: CLINIC | Age: 77
End: 2022-11-23

## 2022-12-07 ENCOUNTER — OFFICE VISIT (OUTPATIENT)
Dept: INTERNAL MEDICINE | Facility: CLINIC | Age: 77
End: 2022-12-07

## 2022-12-07 VITALS
HEART RATE: 80 BPM | SYSTOLIC BLOOD PRESSURE: 117 MMHG | BODY MASS INDEX: 23.7 KG/M2 | OXYGEN SATURATION: 97 % | DIASTOLIC BLOOD PRESSURE: 74 MMHG | TEMPERATURE: 98 F | HEIGHT: 67 IN | WEIGHT: 151 LBS

## 2022-12-07 DIAGNOSIS — Z12.11 ENCOUNTER FOR SCREENING COLONOSCOPY: ICD-10-CM

## 2022-12-07 DIAGNOSIS — I10 PRIMARY HYPERTENSION: Chronic | ICD-10-CM

## 2022-12-07 DIAGNOSIS — Z13.220 LIPID SCREENING: ICD-10-CM

## 2022-12-07 DIAGNOSIS — J30.1 SEASONAL ALLERGIC RHINITIS DUE TO POLLEN: ICD-10-CM

## 2022-12-07 DIAGNOSIS — Z76.89 ENCOUNTER TO ESTABLISH CARE: Primary | ICD-10-CM

## 2022-12-07 DIAGNOSIS — J01.10 SUBACUTE FRONTAL SINUSITIS: ICD-10-CM

## 2022-12-07 DIAGNOSIS — Z85.46 HISTORY OF PROSTATE CANCER: ICD-10-CM

## 2022-12-07 DIAGNOSIS — R91.8 GROUND GLASS OPACITY PRESENT ON IMAGING OF LUNG: ICD-10-CM

## 2022-12-07 DIAGNOSIS — Z87.09 HISTORY OF CHRONIC SINUSITIS: ICD-10-CM

## 2022-12-07 DIAGNOSIS — Z86.010 HISTORY OF COLON POLYPS: ICD-10-CM

## 2022-12-07 PROBLEM — Z87.891 HISTORY OF TOBACCO USE: Status: ACTIVE | Noted: 2022-12-07

## 2022-12-07 LAB
ALBUMIN SERPL-MCNC: 4.5 G/DL (ref 3.5–5.2)
ALBUMIN/GLOB SERPL: 2.3 G/DL
ALP SERPL-CCNC: 45 U/L (ref 39–117)
ALT SERPL-CCNC: 11 U/L (ref 1–41)
AST SERPL-CCNC: 16 U/L (ref 1–40)
BASOPHILS # BLD AUTO: 0.03 10*3/MM3 (ref 0–0.2)
BASOPHILS NFR BLD AUTO: 0.5 % (ref 0–1.5)
BILIRUB SERPL-MCNC: 0.6 MG/DL (ref 0–1.2)
BUN SERPL-MCNC: 20 MG/DL (ref 8–23)
BUN/CREAT SERPL: 14.5 (ref 7–25)
CALCIUM SERPL-MCNC: 9.8 MG/DL (ref 8.6–10.5)
CHLORIDE SERPL-SCNC: 98 MMOL/L (ref 98–107)
CHOLEST SERPL-MCNC: 218 MG/DL (ref 0–200)
CO2 SERPL-SCNC: 31.6 MMOL/L (ref 22–29)
CREAT SERPL-MCNC: 1.38 MG/DL (ref 0.76–1.27)
EGFRCR SERPLBLD CKD-EPI 2021: 53.3 ML/MIN/1.73
EOSINOPHIL # BLD AUTO: 0.1 10*3/MM3 (ref 0–0.4)
EOSINOPHIL NFR BLD AUTO: 1.6 % (ref 0.3–6.2)
ERYTHROCYTE [DISTWIDTH] IN BLOOD BY AUTOMATED COUNT: 13.7 % (ref 12.3–15.4)
GLOBULIN SER CALC-MCNC: 2 GM/DL
GLUCOSE SERPL-MCNC: 81 MG/DL (ref 65–99)
HCT VFR BLD AUTO: 48.7 % (ref 37.5–51)
HDLC SERPL-MCNC: 45 MG/DL (ref 40–60)
HGB BLD-MCNC: 16.3 G/DL (ref 13–17.7)
IMM GRANULOCYTES # BLD AUTO: 0.02 10*3/MM3 (ref 0–0.05)
IMM GRANULOCYTES NFR BLD AUTO: 0.3 % (ref 0–0.5)
LDLC SERPL CALC-MCNC: 157 MG/DL (ref 0–100)
LYMPHOCYTES # BLD AUTO: 1.78 10*3/MM3 (ref 0.7–3.1)
LYMPHOCYTES NFR BLD AUTO: 28.8 % (ref 19.6–45.3)
MCH RBC QN AUTO: 30.5 PG (ref 26.6–33)
MCHC RBC AUTO-ENTMCNC: 33.5 G/DL (ref 31.5–35.7)
MCV RBC AUTO: 91 FL (ref 79–97)
MONOCYTES # BLD AUTO: 0.43 10*3/MM3 (ref 0.1–0.9)
MONOCYTES NFR BLD AUTO: 7 % (ref 5–12)
NEUTROPHILS # BLD AUTO: 3.82 10*3/MM3 (ref 1.7–7)
NEUTROPHILS NFR BLD AUTO: 61.8 % (ref 42.7–76)
NRBC BLD AUTO-RTO: 0 /100 WBC (ref 0–0.2)
PLATELET # BLD AUTO: 239 10*3/MM3 (ref 140–450)
POTASSIUM SERPL-SCNC: 4.1 MMOL/L (ref 3.5–5.2)
PROT SERPL-MCNC: 6.5 G/DL (ref 6–8.5)
RBC # BLD AUTO: 5.35 10*6/MM3 (ref 4.14–5.8)
SODIUM SERPL-SCNC: 138 MMOL/L (ref 136–145)
TRIGL SERPL-MCNC: 90 MG/DL (ref 0–150)
VLDLC SERPL CALC-MCNC: 16 MG/DL (ref 5–40)
WBC # BLD AUTO: 6.18 10*3/MM3 (ref 3.4–10.8)

## 2022-12-07 PROCEDURE — 99214 OFFICE O/P EST MOD 30 MIN: CPT | Performed by: NURSE PRACTITIONER

## 2022-12-07 RX ORDER — AMLODIPINE BESYLATE 10 MG/1
10 TABLET ORAL DAILY
Qty: 90 TABLET | Refills: 1 | Status: SHIPPED | OUTPATIENT
Start: 2022-12-07

## 2022-12-07 RX ORDER — METHYLPREDNISOLONE 4 MG/1
TABLET ORAL
Qty: 21 EACH | Refills: 0 | Status: SHIPPED | OUTPATIENT
Start: 2022-12-07 | End: 2022-12-15

## 2022-12-07 RX ORDER — TRIAMTERENE AND HYDROCHLOROTHIAZIDE 37.5; 25 MG/1; MG/1
1 CAPSULE ORAL EVERY MORNING
Qty: 90 CAPSULE | Refills: 1 | Status: SHIPPED | OUTPATIENT
Start: 2022-12-07

## 2022-12-07 NOTE — PROGRESS NOTES
"Chief Complaint  Med Refill (Amlodipine/ new pt)    Subjective        Grzegorz Price presents to [facility]  History of Present Illness  This is a 75 y/o male presenting to office for establishment of care and chronic health conditions management. Patient is currently partnered and retired.     Patient reports regular exercise. Patient reports following healthy diet.     Patient denies any current tobacco use. Reports hx of tobacco use history of 40 years-- 1 ppd. Patient reports social alcohol use.     Patient has hx of ground glass opacity on previous CT of chest-- recommended surveillance; following with Dr. Giordano.     [facility] Dr Dempsey; hx of prostate cancer.     Patient has hx COPD-- follows with Dr. Solis; continues on spiriva and advair inhalers; using albuterol PRN. Patient reports he is doing well with inhalers.     HTN-- continues on amlodipine and dyazide. Reports he does not check BP at home. Denies any CP or heart palpitations.     Patient reports hx of sinusitis-- recently treated back in November 2022 with augmentin. Reports previous sinus surgery.   Continues on nasocort; sometimes taking Walfed. Patient reports he has chronic drainage and allergies. Patient reports he has tried regular antihistamines and flonase but this did not help. Patient reports he notices the chronic congestion at night time hours.     Objective   Vital Signs:  /74 (BP Location: Left arm, Patient Position: Sitting, Cuff Size: Large Adult)   Pulse 80   Temp 98 °F (36.7 °C) (Infrared)   Ht 170.2 cm (67\")   Wt 68.5 kg (151 lb)   SpO2 97%   BMI 23.65 kg/m²   Estimated body mass index is 23.65 kg/m² as calculated from the following:    Height as of this encounter: 170.2 cm (67\").    Weight as of this encounter: 68.5 kg (151 lb).    BMI is within normal parameters. No other follow-up for BMI required.      Physical Exam  Vitals and nursing note reviewed. "   Constitutional:       Appearance: Normal appearance. He is normal weight.   HENT:      Head: Normocephalic and atraumatic.      Right Ear: Tympanic membrane, ear canal and external ear normal.      Left Ear: Tympanic membrane, ear canal and external ear normal.      Nose: Congestion present.      Right Turbinates: Enlarged.      Left Turbinates: Enlarged.      Right Sinus: No maxillary sinus tenderness or frontal sinus tenderness.      Left Sinus: No maxillary sinus tenderness or frontal sinus tenderness.   Eyes:      Conjunctiva/sclera: Conjunctivae normal.      Pupils: Pupils are equal, round, and reactive to light.      Comments: +glasses   Neck:      Vascular: No carotid bruit.   Cardiovascular:      Rate and Rhythm: Normal rate and regular rhythm.      Pulses: Normal pulses.      Heart sounds: Normal heart sounds. No murmur heard.    No friction rub. No gallop.   Pulmonary:      Effort: Pulmonary effort is normal. No respiratory distress.      Breath sounds: Normal breath sounds. No stridor. No wheezing, rhonchi or rales.   Musculoskeletal:         General: Normal range of motion.      Cervical back: Normal range of motion and neck supple.   Skin:     General: Skin is warm and dry.      Capillary Refill: Capillary refill takes less than 2 seconds.   Neurological:      General: No focal deficit present.      Mental Status: He is alert and oriented to person, place, and time. Mental status is at baseline.      Motor: No weakness.   Psychiatric:         Mood and Affect: Mood normal.         Thought Content: Thought content normal.         Judgment: Judgment normal.        Result Review :  The following data was reviewed by: TEDDY Kelly on 12/07/2022:                Assessment and Plan   Diagnoses and all orders for this visit:    1. Encounter to establish care (Primary)    2. Primary hypertension  Assessment & Plan:  Hypertension is improving with treatment.  Continue current treatment regimen.  Blood  pressure will be reassessed at the next regular appointment.    Orders:  -     CBC & Differential  -     Comprehensive metabolic panel  -     amLODIPine (NORVASC) 10 MG tablet; Take 1 tablet by mouth Daily.  Dispense: 90 tablet; Refill: 1  -     triamterene-hydrochlorothiazide (DYAZIDE) 37.5-25 MG per capsule; Take 1 capsule by mouth Every Morning.  Dispense: 90 capsule; Refill: 1    3. Subacute frontal sinusitis  Assessment & Plan:  Medrol pack as ordered.   Continue with nasocort.   Nasal saline Q1H PRN.       Orders:  -     methylPREDNISolone (MEDROL) 4 MG dose pack; Take as directed on package instructions.  Dispense: 21 each; Refill: 0    4. Lipid screening  -     Lipid panel    5. History of colon polyps  -     Ambulatory Referral For Screening Colonoscopy    6. Encounter for screening colonoscopy  -     Ambulatory Referral For Screening Colonoscopy    7. History of chronic sinusitis  -     Ambulatory Referral to ENT (Otolaryngology)    8. Seasonal allergic rhinitis due to pollen  Assessment & Plan:  Continues on walfed and nasocort.   Following with allergist.       9. History of prostate cancer  Assessment & Plan:  [facility] with [facility].       10. Ground glass opacity present on imaging of lung  Assessment & Plan:  Following with Dr. Giordano.   Continues with CT surveillance.              Follow Up   Return in about 6 months (around 6/7/2023) for Medicare Wellness.  Patient was given instructions and counseling regarding his condition or for health maintenance advice. Please see specific information pulled into the AVS if appropriate.

## 2022-12-08 NOTE — PROGRESS NOTES
Please let patient know--   CBC normal  CMP at baseline; shows CKD stage 3; continue with hydration goal of 64 ounces daily; avoid NSAIDS/ibuprofen/aleve.   LDL elevated-- recommend low sat fat/low refined carb diet and exercise. May benefit from low dose statin therapy if LDL continues to increase.     F/u with me as scheduled

## 2022-12-15 ENCOUNTER — OFFICE VISIT (OUTPATIENT)
Dept: SURGERY | Facility: CLINIC | Age: 77
End: 2022-12-15

## 2022-12-15 VITALS
HEART RATE: 84 BPM | WEIGHT: 150 LBS | SYSTOLIC BLOOD PRESSURE: 124 MMHG | DIASTOLIC BLOOD PRESSURE: 78 MMHG | BODY MASS INDEX: 23.54 KG/M2 | HEIGHT: 67 IN | OXYGEN SATURATION: 97 %

## 2022-12-15 DIAGNOSIS — R91.8 GROUND GLASS OPACITY PRESENT ON IMAGING OF LUNG: Primary | ICD-10-CM

## 2022-12-15 PROCEDURE — 99212 OFFICE O/P EST SF 10 MIN: CPT | Performed by: THORACIC SURGERY (CARDIOTHORACIC VASCULAR SURGERY)

## 2022-12-15 NOTE — PROGRESS NOTES
"Chief Complaint  Follow-up groundglass opacity    Subjective        Grzegorz Price presents to [facility] THORACIC SURGERY  History of Present Illness     Grzegorz Price was seen in the office today for further follow-up of the groundglass opacity identified on the low-dose screening CT scan.  Since his initial evaluation patient has been doing very well.  He reports a lot of sinus infections and sinus drainage.  He is just finished a course of amoxicillin and steroids for recent infection.  The sinus infections and drainage caused him to have early morning cough productive for thick sputum.  He has no hemoptysis.  He has no pleuritic pain.  He has no hoarseness or change in his voice.  His weight has remained stable.    Objective   Vital Signs:  /78 (BP Location: Left arm, Patient Position: Sitting, Cuff Size: Adult)   Pulse 84   Ht 170.2 cm (67\")   Wt 68 kg (150 lb)   SpO2 97%   BMI 23.49 kg/m²   Estimated body mass index is 23.49 kg/m² as calculated from the following:    Height as of this encounter: 170.2 cm (67\").    Weight as of this encounter: 68 kg (150 lb).    BMI is within normal parameters. No other follow-up for BMI required.      Physical Exam     Neck: No cervical or supraclavicular adenopathy    Pulmonary: Lungs are clear to auscultation with equal breath sounds bilaterally    Cardiac: Regular rate and rhythm.  No murmurs or gallops.  No dependent edema.    Abdomen: Soft and nontender.  No masses or organomegaly.  Good bowel sounds in all quadrants.    Result Review :  The following data was reviewed by: Dakota Giordano III, MD on 12/15/2022:    CT scan of the chest performed November 9, 2022 was independently reviewed and compared to the previous CT scans.  Patient has bullous emphysema which is stable.  Vague 10 mm focus of groundglass opacity along the medial right lower lobe is unchanged.  There is a second 19 mm focus of groundglass opacity in the right lower lobe.  " There is a third focus of groundglass opacity measuring 12 mm along the base of the right middle lobe.  There are no suspicious hilar or mediastinal lymph nodes.  There is no pleural effusion.  No bony abnormalities.  Few scattered calcified granulomas         Assessment and Plan   Diagnoses and all orders for this visit:    1. Ground glass opacity present on imaging of lung (Primary)  -     CT Chest Without Contrast; Future      Suspect these groundglass opacities are related to the patient's sinus drainage with nocturnal aspiration.  Given his smoking history close observation is necessary.  I have recommended another CT of the chest in 6 months.  He will return to our office with a CT of the chest without contrast in approximately 6 months.  We will keep you informed of his progress.       I spent 15 minutes caring for Grzegorz on this date of service. This time includes time spent by me in the following activities:preparing for the visit, reviewing tests, performing a medically appropriate examination and/or evaluation , counseling and educating the patient/family/caregiver, ordering medications, tests, or procedures, referring and communicating with other health care professionals , documenting information in the medical record, independently interpreting results and communicating that information with the patient/family/caregiver and care coordination  Follow Up   Return in about 6 months (around 6/15/2023) for Recheck.  Patient was given instructions and counseling regarding his condition or for health maintenance advice. Please see specific information pulled into the AVS if appropriate.

## 2023-01-20 ENCOUNTER — PRE-PROCEDURE SCREENING (OUTPATIENT)
Dept: GASTROENTEROLOGY | Facility: CLINIC | Age: 78
End: 2023-01-20
Payer: MEDICARE

## 2023-01-20 NOTE — TELEPHONE ENCOUNTER
LAST SCOPE 7/18 IN Epic --Personal history of polyps--No family history of polyps or colon cancer--No blood thinners--Medications:          ALBUTEROL SULFATE  (90 Base) MCG/ACT inhaler    amLODIPine (NORVASC) 10 MG tablet  fluticasone-salmeterol (ADVAIR) 250-50 MCG/DOSE DISKUS  SPIRIVA HANDIHALER 18 MCG per inhalation capsule    triamterene-hydrochlorothiazide (DYAZIDE) 37.5-25 MG per capsule                QUESTIONNAIRE SCEEENING  HAS BEEN SENT TO DOCTOR FOR REVIEW

## 2023-01-26 ENCOUNTER — PREP FOR SURGERY (OUTPATIENT)
Dept: OTHER | Facility: HOSPITAL | Age: 78
End: 2023-01-26
Payer: MEDICARE

## 2023-01-26 DIAGNOSIS — Z86.010 PERSONAL HISTORY OF COLONIC POLYPS: Primary | ICD-10-CM

## 2023-01-26 RX ORDER — SODIUM CHLORIDE, SODIUM LACTATE, POTASSIUM CHLORIDE, CALCIUM CHLORIDE 600; 310; 30; 20 MG/100ML; MG/100ML; MG/100ML; MG/100ML
30 INJECTION, SOLUTION INTRAVENOUS CONTINUOUS
Status: CANCELLED | OUTPATIENT
Start: 2023-04-27

## 2023-02-20 ENCOUNTER — TELEPHONE (OUTPATIENT)
Dept: GASTROENTEROLOGY | Facility: CLINIC | Age: 78
End: 2023-02-20
Payer: MEDICARE

## 2023-02-20 PROBLEM — Z86.0100 PERSONAL HISTORY OF COLONIC POLYPS: Status: ACTIVE | Noted: 2023-02-20

## 2023-02-20 PROBLEM — Z86.010 PERSONAL HISTORY OF COLONIC POLYPS: Status: ACTIVE | Noted: 2023-02-20

## 2023-02-20 NOTE — TELEPHONE ENCOUNTER
FLAKO Lindsey for colonoscopy on 4/27/23  arrive at  730am  . Mailed Prep instructions to Mailing address on-file. ----miralax

## 2023-04-27 ENCOUNTER — HOSPITAL ENCOUNTER (OUTPATIENT)
Facility: HOSPITAL | Age: 78
Setting detail: HOSPITAL OUTPATIENT SURGERY
Discharge: HOME | End: 2023-04-27
Attending: INTERNAL MEDICINE | Admitting: INTERNAL MEDICINE
Payer: MEDICARE

## 2023-04-27 ENCOUNTER — ANESTHESIA (OUTPATIENT)
Dept: GASTROENTEROLOGY | Facility: HOSPITAL | Age: 78
End: 2023-04-27
Payer: MEDICARE

## 2023-04-27 ENCOUNTER — ANESTHESIA EVENT (OUTPATIENT)
Dept: GASTROENTEROLOGY | Facility: HOSPITAL | Age: 78
End: 2023-04-27
Payer: MEDICARE

## 2023-04-27 VITALS
DIASTOLIC BLOOD PRESSURE: 81 MMHG | HEART RATE: 66 BPM | OXYGEN SATURATION: 99 % | RESPIRATION RATE: 17 BRPM | HEIGHT: 67 IN | WEIGHT: 151 LBS | BODY MASS INDEX: 23.7 KG/M2 | SYSTOLIC BLOOD PRESSURE: 135 MMHG | TEMPERATURE: 97.8 F

## 2023-04-27 DIAGNOSIS — Z86.010 PERSONAL HISTORY OF COLONIC POLYPS: ICD-10-CM

## 2023-04-27 PROCEDURE — 25010000002 PROPOFOL 10 MG/ML EMULSION: Performed by: ANESTHESIOLOGY

## 2023-04-27 PROCEDURE — 88305 TISSUE EXAM BY PATHOLOGIST: CPT | Performed by: INTERNAL MEDICINE

## 2023-04-27 PROCEDURE — 45385 COLONOSCOPY W/LESION REMOVAL: CPT | Performed by: INTERNAL MEDICINE

## 2023-04-27 RX ORDER — LIDOCAINE HYDROCHLORIDE 10 MG/ML
0.5 INJECTION, SOLUTION INFILTRATION; PERINEURAL ONCE AS NEEDED
Status: DISCONTINUED | OUTPATIENT
Start: 2023-04-27 | End: 2023-04-27 | Stop reason: HOSPADM

## 2023-04-27 RX ORDER — PROPOFOL 10 MG/ML
VIAL (ML) INTRAVENOUS AS NEEDED
Status: DISCONTINUED | OUTPATIENT
Start: 2023-04-27 | End: 2023-04-27 | Stop reason: SURG

## 2023-04-27 RX ORDER — SODIUM CHLORIDE 0.9 % (FLUSH) 0.9 %
10 SYRINGE (ML) INJECTION AS NEEDED
Status: DISCONTINUED | OUTPATIENT
Start: 2023-04-27 | End: 2023-04-27 | Stop reason: HOSPADM

## 2023-04-27 RX ORDER — LIDOCAINE HYDROCHLORIDE 20 MG/ML
INJECTION, SOLUTION INFILTRATION; PERINEURAL AS NEEDED
Status: DISCONTINUED | OUTPATIENT
Start: 2023-04-27 | End: 2023-04-27 | Stop reason: SURG

## 2023-04-27 RX ORDER — SODIUM CHLORIDE, SODIUM LACTATE, POTASSIUM CHLORIDE, CALCIUM CHLORIDE 600; 310; 30; 20 MG/100ML; MG/100ML; MG/100ML; MG/100ML
1000 INJECTION, SOLUTION INTRAVENOUS CONTINUOUS
Status: DISCONTINUED | OUTPATIENT
Start: 2023-04-27 | End: 2023-04-27 | Stop reason: HOSPADM

## 2023-04-27 RX ADMIN — SODIUM CHLORIDE, POTASSIUM CHLORIDE, SODIUM LACTATE AND CALCIUM CHLORIDE 1000 ML: 600; 310; 30; 20 INJECTION, SOLUTION INTRAVENOUS at 08:05

## 2023-04-27 RX ADMIN — PROPOFOL 100 MCG/KG/MIN: 10 INJECTION, EMULSION INTRAVENOUS at 08:54

## 2023-04-27 RX ADMIN — PROPOFOL 70 MG: 10 INJECTION, EMULSION INTRAVENOUS at 08:52

## 2023-04-27 RX ADMIN — LIDOCAINE HYDROCHLORIDE 30 MG: 20 INJECTION, SOLUTION INFILTRATION; PERINEURAL at 08:49

## 2023-04-27 NOTE — H&P
"[facility]  Pre Procedure History & Physical    Chief Complaint:   Time for my colonoscopy    Subjective     HPI:   77 y.o. male presenting to endoscopy unit today for surveillance colonoscopy.    Past Medical History:   Past Medical History:   Diagnosis Date   • Cerumen impaction    • Hypertension    • Prostate cancer        Family History:  Family History   Problem Relation Age of Onset   • No Known Problems Mother    • Throat cancer Father    • Malig Hyperthermia Neg Hx        Social History:   reports that he has quit smoking. His smoking use included cigarettes. He has never used smokeless tobacco. He reports that he does not drink alcohol and does not use drugs.    Medications:   Medications Prior to Admission   Medication Sig Dispense Refill Last Dose   • ALBUTEROL SULFATE  (90 Base) MCG/ACT inhaler INHALE TWO PUFFS BY MOUTH EVERY 4 HOURS AS NEEDED FOR WHEEZING WHEN NECESSARY FOR DYSPNEA 1 inhaler 0    • amLODIPine (NORVASC) 10 MG tablet Take 1 tablet by mouth Daily. 90 tablet 1    • fluticasone-salmeterol (ADVAIR) 250-50 MCG/DOSE DISKUS 1 puff 2 (Two) Times a Day.      • SPIRIVA HANDIHALER 18 MCG per inhalation capsule 1 capsule Daily.      • triamterene-hydrochlorothiazide (DYAZIDE) 37.5-25 MG per capsule Take 1 capsule by mouth Every Morning. 90 capsule 1        Allergies:  Other    Objective     Height 170.2 cm (67.01\").  Physical Exam:   General: patient awake, alert and cooperative    Assessment & Plan     Diagnosis:  Personal hx of colon polyps    Anticipated Surgical Procedure:  Colonoscopy    The risks, benefits, and alternatives of this procedure have been discussed with the patient or the responsible party- the patient understands and agrees to proceed.                                                                "

## 2023-04-27 NOTE — ANESTHESIA POSTPROCEDURE EVALUATION
Patient: Grzegorz Price    Procedure Summary     Date: 04/27/23 Room / Location: [facility] ENDOSCOPY 10 /  [facility]    Anesthesia Start: 0847 Anesthesia Stop: 0912    Procedure: COLONOSCOPY INTO CECUM WITH POLYPECTOMY (COLD SNARE) Diagnosis:       Personal history of colonic polyps      (Personal history of colonic polyps [Z86.010])    Surgeons: Eyal Hilliard MD Provider: Kalpana Salas MD    Anesthesia Type: MAC ASA Status: 3          Anesthesia Type: No value filed.    Vitals  Vitals Value Taken Time   BP 90/57 04/27/23 0909   Temp     Pulse 61 04/27/23 0909   Resp 17 04/27/23 0909   SpO2 98 % 04/27/23 0909           Post Anesthesia Care and Evaluation    Patient location during evaluation: bedside  Patient participation: complete - patient participated  Level of consciousness: awake  Pain management: adequate    Airway patency: patent  Anesthetic complications: No anesthetic complications    Cardiovascular status: acceptable  Respiratory status: acceptable  Hydration status: acceptable

## 2023-04-27 NOTE — ANESTHESIA PREPROCEDURE EVALUATION
Anesthesia Evaluation                  Airway   Mallampati: III  TM distance: >3 FB  Neck ROM: limited  Dental          Pulmonary - normal exam   (+) COPD,   Cardiovascular     (+) hypertension,       Neuro/Psych  GI/Hepatic/Renal/Endo      Musculoskeletal     Abdominal    Substance History      OB/GYN          Other      history of cancer                    Anesthesia Plan    ASA 3     MAC     intravenous induction     Anesthetic plan, risks, benefits, and alternatives have been provided, discussed and informed consent has been obtained with: patient.        CODE STATUS:

## 2023-04-28 LAB
LAB AP CASE REPORT: NORMAL
PATH REPORT.FINAL DX SPEC: NORMAL
PATH REPORT.GROSS SPEC: NORMAL

## 2023-06-09 ENCOUNTER — OFFICE VISIT (OUTPATIENT)
Dept: INTERNAL MEDICINE | Facility: CLINIC | Age: 78
End: 2023-06-09
Payer: MEDICARE

## 2023-06-09 VITALS
OXYGEN SATURATION: 95 % | SYSTOLIC BLOOD PRESSURE: 135 MMHG | DIASTOLIC BLOOD PRESSURE: 82 MMHG | WEIGHT: 147 LBS | HEIGHT: 67 IN | HEART RATE: 64 BPM | BODY MASS INDEX: 23.07 KG/M2

## 2023-06-09 DIAGNOSIS — I10 PRIMARY HYPERTENSION: ICD-10-CM

## 2023-06-09 DIAGNOSIS — R09.89 LEFT CAROTID BRUIT: ICD-10-CM

## 2023-06-09 DIAGNOSIS — N18.31 STAGE 3A CHRONIC KIDNEY DISEASE: ICD-10-CM

## 2023-06-09 DIAGNOSIS — J30.1 SEASONAL ALLERGIC RHINITIS DUE TO POLLEN: ICD-10-CM

## 2023-06-09 DIAGNOSIS — Z85.46 HISTORY OF PROSTATE CANCER: ICD-10-CM

## 2023-06-09 DIAGNOSIS — E78.2 MIXED HYPERLIPIDEMIA: ICD-10-CM

## 2023-06-09 DIAGNOSIS — Z00.00 MEDICARE ANNUAL WELLNESS VISIT, SUBSEQUENT: Primary | ICD-10-CM

## 2023-06-09 DIAGNOSIS — J43.8 OTHER EMPHYSEMA: Chronic | ICD-10-CM

## 2023-06-09 DIAGNOSIS — R91.8 GROUND GLASS OPACITY PRESENT ON IMAGING OF LUNG: Chronic | ICD-10-CM

## 2023-06-09 PROBLEM — J01.10 SUBACUTE FRONTAL SINUSITIS: Status: RESOLVED | Noted: 2022-12-07 | Resolved: 2023-06-09

## 2023-06-09 LAB
ALBUMIN SERPL-MCNC: 4.4 G/DL (ref 3.5–5.2)
ALBUMIN/GLOB SERPL: 1.9 G/DL
ALP SERPL-CCNC: 44 U/L (ref 39–117)
ALT SERPL-CCNC: 26 U/L (ref 1–41)
AST SERPL-CCNC: 21 U/L (ref 1–40)
BASOPHILS # BLD AUTO: 0.02 10*3/MM3 (ref 0–0.2)
BASOPHILS NFR BLD AUTO: 0.4 % (ref 0–1.5)
BILIRUB SERPL-MCNC: 0.6 MG/DL (ref 0–1.2)
BUN SERPL-MCNC: 28 MG/DL (ref 8–23)
BUN/CREAT SERPL: 21.5 (ref 7–25)
CALCIUM SERPL-MCNC: 10.2 MG/DL (ref 8.6–10.5)
CHLORIDE SERPL-SCNC: 101 MMOL/L (ref 98–107)
CHOLEST SERPL-MCNC: 260 MG/DL (ref 0–200)
CO2 SERPL-SCNC: 32.8 MMOL/L (ref 22–29)
CREAT SERPL-MCNC: 1.3 MG/DL (ref 0.76–1.27)
EGFRCR SERPLBLD CKD-EPI 2021: 56.9 ML/MIN/1.73
EOSINOPHIL # BLD AUTO: 0.13 10*3/MM3 (ref 0–0.4)
EOSINOPHIL NFR BLD AUTO: 2.7 % (ref 0.3–6.2)
ERYTHROCYTE [DISTWIDTH] IN BLOOD BY AUTOMATED COUNT: 14.6 % (ref 12.3–15.4)
GLOBULIN SER CALC-MCNC: 2.3 GM/DL
GLUCOSE SERPL-MCNC: 80 MG/DL (ref 65–99)
HCT VFR BLD AUTO: 47.6 % (ref 37.5–51)
HDLC SERPL-MCNC: 74 MG/DL (ref 40–60)
HGB BLD-MCNC: 16.4 G/DL (ref 13–17.7)
IMM GRANULOCYTES # BLD AUTO: 0.01 10*3/MM3 (ref 0–0.05)
IMM GRANULOCYTES NFR BLD AUTO: 0.2 % (ref 0–0.5)
LDLC SERPL CALC-MCNC: 164 MG/DL (ref 0–100)
LYMPHOCYTES # BLD AUTO: 2.01 10*3/MM3 (ref 0.7–3.1)
LYMPHOCYTES NFR BLD AUTO: 42.1 % (ref 19.6–45.3)
MCH RBC QN AUTO: 30.9 PG (ref 26.6–33)
MCHC RBC AUTO-ENTMCNC: 34.5 G/DL (ref 31.5–35.7)
MCV RBC AUTO: 89.6 FL (ref 79–97)
MONOCYTES # BLD AUTO: 0.36 10*3/MM3 (ref 0.1–0.9)
MONOCYTES NFR BLD AUTO: 7.5 % (ref 5–12)
NEUTROPHILS # BLD AUTO: 2.25 10*3/MM3 (ref 1.7–7)
NEUTROPHILS NFR BLD AUTO: 47.1 % (ref 42.7–76)
NRBC BLD AUTO-RTO: 0 /100 WBC (ref 0–0.2)
PLATELET # BLD AUTO: 241 10*3/MM3 (ref 140–450)
POTASSIUM SERPL-SCNC: 3.8 MMOL/L (ref 3.5–5.2)
PROT SERPL-MCNC: 6.7 G/DL (ref 6–8.5)
RBC # BLD AUTO: 5.31 10*6/MM3 (ref 4.14–5.8)
SODIUM SERPL-SCNC: 142 MMOL/L (ref 136–145)
TRIGL SERPL-MCNC: 123 MG/DL (ref 0–150)
VLDLC SERPL CALC-MCNC: 22 MG/DL (ref 5–40)
WBC # BLD AUTO: 4.78 10*3/MM3 (ref 3.4–10.8)

## 2023-06-09 NOTE — PROGRESS NOTES
The ABCs of the Annual Wellness Visit  Subsequent Medicare Wellness Visit    Subjective    Grzegorz Price is a 77 y.o. male who presents for a Subsequent Medicare Wellness Visit.    The following portions of the patient's history were reviewed and   updated as appropriate: allergies, current medications, past family history, past medical history, past social history, past surgical history, and problem list.    Compared to one year ago, the patient feels his physical   health is better.    Compared to one year ago, the patient feels his mental   health is the same.    Recent Hospitalizations:  He was not admitted to the hospital during the last year.       Current Medical Providers:  TEDDY Kelly-- PCP  Dr. Solis-- allergy  Dr. Bowers-- thoracic sx  Dr. Dempsey-- [facility] urology  KORIN Ann-- [facility] orthopedics        Outpatient Medications Prior to Visit   Medication Sig Dispense Refill    ALBUTEROL SULFATE  (90 Base) MCG/ACT inhaler INHALE TWO PUFFS BY MOUTH EVERY 4 HOURS AS NEEDED FOR WHEEZING WHEN NECESSARY FOR DYSPNEA 1 inhaler 0    amLODIPine (NORVASC) 10 MG tablet Take 1 tablet by mouth Daily. 90 tablet 1    fluticasone-salmeterol (ADVAIR) 250-50 MCG/DOSE DISKUS 1 puff 2 (Two) Times a Day.      SPIRIVA HANDIHALER 18 MCG per inhalation capsule 1 capsule Daily.      triamterene-hydrochlorothiazide (DYAZIDE) 37.5-25 MG per capsule Take 1 capsule by mouth Every Morning. 90 capsule 1     No facility-administered medications prior to visit.       No opioid medication identified on active medication list. I have reviewed chart for other potential  high risk medication/s and harmful drug interactions in the elderly.        Aspirin is not on active medication list.  Aspirin use is not indicated based on review of current medical condition/s. Risk of harm outweighs potential benefits.  .    Patient Active Problem List   Diagnosis    Screening for malignant neoplasm of colon    Secondary polycythemia     "History of prostate cancer    Primary hypertension    Ground glass opacity present on imaging of lung    Seasonal allergic rhinitis due to pollen    History of chronic sinusitis    History of tobacco use    Personal history of colonic polyps    Other emphysema    Mixed hyperlipidemia    Stage 3a chronic kidney disease     Advance Care Planning   Advance Care Planning     Advance Directive is not on file.  ACP discussion was held with the patient during this visit. Patient does not have an advance directive, information provided.     Objective    Vitals:    23 0804 23 0834   BP: 146/92 135/82   BP Location: Left arm Left arm   Patient Position: Sitting Sitting   Cuff Size: Adult Adult   Pulse: 64    SpO2: 95%    Weight: 66.7 kg (147 lb)    Height: 170.2 cm (67\")      Estimated body mass index is 23.02 kg/m² as calculated from the following:    Height as of this encounter: 170.2 cm (67\").    Weight as of this encounter: 66.7 kg (147 lb).    BMI is within normal parameters. No other follow-up for BMI required.      Does the patient have evidence of cognitive impairment? No          HEALTH RISK ASSESSMENT    Smoking Status:  Social History     Tobacco Use   Smoking Status Former    Years: 40.00    Types: Cigarettes    Quit date:     Years since quittin.4   Smokeless Tobacco Never     Alcohol Consumption:  Social History     Substance and Sexual Activity   Alcohol Use No     Fall Risk Screen:    STEADI Fall Risk Assessment was completed, and patient is at LOW risk for falls.Assessment completed on:2023    Depression Screenin/9/2023     8:17 AM   PHQ-2/PHQ-9 Depression Screening   Little Interest or Pleasure in Doing Things 0-->not at all   Feeling Down, Depressed or Hopeless 0-->not at all   PHQ-9: Brief Depression Severity Measure Score 0       Health Habits and Functional and Cognitive Screenin/9/2023     8:14 AM   Functional & Cognitive Status   Do you have difficulty " preparing food and eating? No   Do you have difficulty bathing yourself, getting dressed or grooming yourself? No   Do you have difficulty using the toilet? No   Do you have difficulty moving around from place to place? No   Do you have trouble with steps or getting out of a bed or a chair? No   Current Diet Well Balanced Diet   Dental Exam Up to date   Eye Exam Up to date   Exercise (times per week) 7 times per week   Current Exercises Include Walking;Weightlifting   Do you need help using the phone?  No   Are you deaf or do you have serious difficulty hearing?  No   Do you need help with transportation? No   Do you need help shopping? No   Do you need help preparing meals?  No   Do you need help with housework?  No   Do you need help with laundry? No   Do you need help taking your medications? No   Do you need help managing money? No   Do you ever drive or ride in a car without wearing a seat belt? No       Age-appropriate Screening Schedule:  Refer to the list below for future screening recommendations based on patient's age, sex and/or medical conditions. Orders for these recommended tests are listed in the plan section. The patient has been provided with a written plan.    Health Maintenance   Topic Date Due    COVID-19 Vaccine (4 - Pfizer series) 06/11/2023 (Originally 4/3/2022)    Pneumococcal Vaccine 65+ (1 - PCV) 12/07/2023 (Originally 12/27/1951)    TDAP/TD VACCINES (1 - Tdap) 12/07/2023 (Originally [birth year]/27/1965)    ZOSTER VACCINE (1 of 2) 06/09/2024 (Originally 12/27/1996)    INFLUENZA VACCINE  08/01/2023    LIPID PANEL  12/07/2023    ANNUAL WELLNESS VISIT  06/09/2024    COLORECTAL CANCER SCREENING  04/27/2026    HEPATITIS C SCREENING  Discontinued                  CMS Preventative Services Quick Reference  Risk Factors Identified During Encounter  Immunizations Discussed/Encouraged: COVID19  Dental Screening Recommended  Vision Screening Recommended  The above risks/problems have been discussed with the  "patient.  Pertinent information has been shared with the patient in the After Visit Summary.  An After Visit Summary and PPPS were made available to the patient.    Follow Up:   Next Medicare Wellness visit to be scheduled in 1 year.       Additional E&M Note during same encounter follows:  Patient has multiple medical problems which are significant and separately identifiable that require additional work above and beyond the Medicare Wellness Visit.      Chief Complaint  Medicare Wellness-subsequent    Subjective        HPI  Grzegorz Price was noted on physical exam today for + right carotid bruit. He denies any dizziness or h/a. Reports he has never had a carotid doppler study. Agreeable to proceed.          Objective   Vital Signs:  /82 (BP Location: Left arm, Patient Position: Sitting, Cuff Size: Adult)   Pulse 64   Ht 170.2 cm (67\")   Wt 66.7 kg (147 lb)   SpO2 95%   BMI 23.02 kg/m²     Physical Exam  Constitutional:       Appearance: Normal appearance.   HENT:      Head: Normocephalic and atraumatic.      Right Ear: External ear normal.      Left Ear: External ear normal.      Nose: Nose normal.      Mouth/Throat:      Mouth: Mucous membranes are moist.      Pharynx: Oropharynx is clear.   Eyes:      Conjunctiva/sclera: Conjunctivae normal.      Pupils: Pupils are equal, round, and reactive to light.   Neck:      Vascular: Carotid bruit present.   Cardiovascular:      Rate and Rhythm: Normal rate and regular rhythm.      Pulses: Normal pulses.      Heart sounds: Normal heart sounds. No murmur heard.    No friction rub. No gallop.   Pulmonary:      Effort: Pulmonary effort is normal. No respiratory distress.      Breath sounds: Normal breath sounds. No stridor. No wheezing, rhonchi or rales.   Musculoskeletal:         General: No swelling. Normal range of motion.      Cervical back: Normal range of motion and neck supple.   Skin:     General: Skin is warm and dry.   Neurological:      General: No " focal deficit present.      Mental Status: He is alert and oriented to person, place, and time. Mental status is at baseline.      Motor: No weakness.   Psychiatric:         Mood and Affect: Mood normal.         Thought Content: Thought content normal.         Judgment: Judgment normal.        The following data was reviewed by: TEDDY Kelly on 06/09/2023:  Common labs          12/7/2022    09:59   Common Labs   Glucose 81    BUN 20    Creatinine 1.38    Sodium 138    Potassium 4.1    Chloride 98    Calcium 9.8    Total Protein 6.5    Albumin 4.50    Total Bilirubin 0.6    Alkaline Phosphatase 45    AST (SGOT) 16    ALT (SGPT) 11    WBC 6.18    Hemoglobin 16.3    Hematocrit 48.7    Platelets 239    Total Cholesterol 218    Triglycerides 90    HDL Cholesterol 45    LDL Cholesterol  157                 Assessment and Plan   Diagnoses and all orders for this visit:    1. Medicare annual wellness visit, subsequent (Primary)    2. Primary hypertension  Assessment & Plan:  Hypertension is improving with treatment.  Continue current treatment regimen.  Dietary sodium restriction.  Regular aerobic exercise.  Continue current medications.  Blood pressure will be reassessed at the next regular appointment.    Orders:  -     CBC & Differential  -     Comprehensive metabolic panel    3. Ground glass opacity present on imaging of lung  Assessment & Plan:  Continues following with thoracic surgery for surveillance      4. Other emphysema  Assessment & Plan:  COPD is improving with treatment.  Discussed monitoring symptoms and use of quick-relief medications and contacting us early in the course of exacerbations.  Warning signs of respiratory distress were reviewed with the patient.   Continue current medications.          5. Seasonal allergic rhinitis due to pollen  Assessment & Plan:  Following with allergist        6. History of prostate cancer  Assessment & Plan:  Recurrence; [facility]       7. Mixed  hyperlipidemia  Assessment & Plan:  Lipid abnormalities are unchanged.  Nutritional counseling was provided.  Lipids will be reassessed in 6 months.    Orders:  -     Lipid panel    8. Left carotid bruit  -     US Carotid Bilateral; Future    9. Stage 3a chronic kidney disease  Assessment & Plan:  Labs today  Avoid NSAIDS                 Follow Up   Return in about 6 months (around 12/9/2023) for Recheck chronic health conditions.  Patient was given instructions and counseling regarding his condition or for health maintenance advice. Please see specific information pulled into the AVS if appropriate.

## 2023-06-10 DIAGNOSIS — I10 PRIMARY HYPERTENSION: Chronic | ICD-10-CM

## 2023-06-12 RX ORDER — AMLODIPINE BESYLATE 10 MG/1
TABLET ORAL
Qty: 90 TABLET | Refills: 1 | Status: SHIPPED | OUTPATIENT
Start: 2023-06-12

## 2023-06-14 ENCOUNTER — TELEPHONE (OUTPATIENT)
Dept: GASTROENTEROLOGY | Facility: CLINIC | Age: 78
End: 2023-06-14
Payer: MEDICARE

## 2023-06-14 ENCOUNTER — HOSPITAL ENCOUNTER (OUTPATIENT)
Dept: CT IMAGING | Facility: HOSPITAL | Age: 78
End: 2023-06-14
Payer: MEDICARE

## 2023-06-14 NOTE — TELEPHONE ENCOUNTER
----- Message from Eyal Hilliard MD sent at 5/17/2023 11:10 AM EDT -----  The polyp(s) biopsies showed adenomatous change. This is not cancerous but is considered potentially precancerous. Follow-up colonoscopy not recommend assuming absence of any GI symptoms.  Office f/u PRN.

## 2023-07-24 RX ORDER — ATORVASTATIN CALCIUM 40 MG/1
40 TABLET, FILM COATED ORAL DAILY
Qty: 30 TABLET | Refills: 11 | Status: SHIPPED | OUTPATIENT
Start: 2023-07-24 | End: 2024-07-23

## 2023-07-26 ENCOUNTER — OFFICE VISIT (OUTPATIENT)
Dept: SURGERY | Facility: CLINIC | Age: 78
End: 2023-07-26
Payer: MEDICARE

## 2023-07-26 VITALS
DIASTOLIC BLOOD PRESSURE: 86 MMHG | HEIGHT: 67 IN | BODY MASS INDEX: 22.76 KG/M2 | WEIGHT: 145 LBS | HEART RATE: 64 BPM | OXYGEN SATURATION: 95 % | SYSTOLIC BLOOD PRESSURE: 140 MMHG

## 2023-07-26 DIAGNOSIS — R91.8 GROUND GLASS OPACITY PRESENT ON IMAGING OF LUNG: Primary | ICD-10-CM

## 2023-07-26 PROCEDURE — 3079F DIAST BP 80-89 MM HG: CPT | Performed by: STUDENT IN AN ORGANIZED HEALTH CARE EDUCATION/TRAINING PROGRAM

## 2023-07-26 PROCEDURE — 3077F SYST BP >= 140 MM HG: CPT | Performed by: STUDENT IN AN ORGANIZED HEALTH CARE EDUCATION/TRAINING PROGRAM

## 2023-07-26 PROCEDURE — 99213 OFFICE O/P EST LOW 20 MIN: CPT | Performed by: STUDENT IN AN ORGANIZED HEALTH CARE EDUCATION/TRAINING PROGRAM

## 2023-07-26 NOTE — PROGRESS NOTES
"Chief Complaint  Right lower lobe GGO    Subjective        Grzegorz Price presents to [facility] THORACIC SURGERY  History of Present Illness  Mr. Price is a pleasant 77-year-old gentleman who presents today in follow-up after undergoing a CT/PET scan for screening of a right lower lobe GGO.  He is an ex-smoker.  He also has a history of prostate cancer.  He denies any current symptoms.  He denies any fevers, chills and or cough.  He denies any unintentional weight loss.  He denies any current infections.  Objective   Vital Signs:  /86 (BP Location: Left arm, Patient Position: Sitting, Cuff Size: Adult)   Pulse 64   Ht 170.2 cm (67.01\")   Wt 65.8 kg (145 lb)   SpO2 95%   BMI 22.70 kg/m²   Estimated body mass index is 22.7 kg/m² as calculated from the following:    Height as of this encounter: 170.2 cm (67.01\").    Weight as of this encounter: 65.8 kg (145 lb).       BMI is within normal parameters. No other follow-up for BMI required.      Physical Exam  Vitals reviewed.   Constitutional:       Appearance: Normal appearance.   HENT:      Head: Normocephalic.   Cardiovascular:      Rate and Rhythm: Normal rate and regular rhythm.      Pulses: Normal pulses.      Heart sounds: Normal heart sounds.   Pulmonary:      Effort: Pulmonary effort is normal.      Breath sounds: Normal breath sounds.   Skin:     Capillary Refill: Capillary refill takes less than 2 seconds.   Neurological:      General: No focal deficit present.      Mental Status: He is alert and oriented to person, place, and time.   Psychiatric:         Mood and Affect: Mood normal.         Behavior: Behavior normal.         Thought Content: Thought content normal.         Judgment: Judgment normal.      Result Review :  CT/PET scan was visualized by myself.  I agree with the interpretation.           Assessment and Plan   Diagnoses and all orders for this visit:    1. Ground glass opacity present on imaging of lung (Primary)  -  "    CT Chest Without Contrast; Future    Mr Price is a very pleasant 77-year-old gentleman who presents today in follow-up in regards to a right lower lobe GGO.  He underwent a CT/PET scan to evaluate for possible metabolic activity.  This PET scan did not show any suspicious areas.  In regards to continued surveillance with this groundglass opacity.  I would recommend a noncontrasted scan in 6 months time.  This is due to his significant smoking history.  We will continue to watch this lesion and if it grows in size, possibly obtain tissue sampling.  I will see him 6 months follow-up with a noncontrasted CT scan of the chest.       I spent 20 minutes caring for Grzegorz on this date of service. This time includes time spent by me in the following activities:preparing for the visit, reviewing tests, obtaining and/or reviewing a separately obtained history, performing a medically appropriate examination and/or evaluation , counseling and educating the patient/family/caregiver, ordering medications, tests, or procedures, referring and communicating with other health care professionals , documenting information in the medical record, independently interpreting results and communicating that information with the patient/family/caregiver, and care coordination  Follow Up   No follow-ups on file.  Patient was given instructions and counseling regarding his condition or for health maintenance advice. Please see specific information pulled into the AVS if appropriate.

## 2023-08-08 ENCOUNTER — LAB (OUTPATIENT)
Dept: LAB | Facility: HOSPITAL | Age: 78
End: 2023-08-08
Payer: MEDICARE

## 2023-08-08 ENCOUNTER — HOSPITAL ENCOUNTER (OUTPATIENT)
Dept: CARDIOLOGY | Facility: HOSPITAL | Age: 78
Discharge: HOME | End: 2023-08-08
Payer: MEDICARE

## 2023-08-08 ENCOUNTER — TRANSCRIBE ORDERS (OUTPATIENT)
Dept: LAB | Facility: HOSPITAL | Age: 78
End: 2023-08-08
Payer: MEDICARE

## 2023-08-08 ENCOUNTER — TRANSCRIBE ORDERS (OUTPATIENT)
Dept: CARDIOLOGY | Facility: HOSPITAL | Age: 78
End: 2023-08-08
Payer: MEDICARE

## 2023-08-08 DIAGNOSIS — Z01.811 PRE-OP CHEST EXAM: Primary | ICD-10-CM

## 2023-08-08 DIAGNOSIS — C61 MALIGNANT NEOPLASM OF PROSTATE: Primary | ICD-10-CM

## 2023-08-08 DIAGNOSIS — C61 MALIGNANT NEOPLASM OF PROSTATE: ICD-10-CM

## 2023-08-08 LAB
ALBUMIN SERPL-MCNC: 4.8 G/DL (ref 3.5–5.2)
ALBUMIN/GLOB SERPL: 2.4 G/DL
ALP SERPL-CCNC: 47 U/L (ref 39–117)
ALT SERPL W P-5'-P-CCNC: 28 U/L (ref 1–41)
ANION GAP SERPL CALCULATED.3IONS-SCNC: 11 MMOL/L (ref 5–15)
AST SERPL-CCNC: 29 U/L (ref 1–40)
BASOPHILS # BLD AUTO: 0.03 10*3/MM3 (ref 0–0.2)
BASOPHILS NFR BLD AUTO: 0.6 % (ref 0–1.5)
BILIRUB SERPL-MCNC: 0.7 MG/DL (ref 0–1.2)
BUN SERPL-MCNC: 19 MG/DL (ref 8–23)
BUN/CREAT SERPL: 17.1 (ref 7–25)
CALCIUM SPEC-SCNC: 9.8 MG/DL (ref 8.6–10.5)
CHLORIDE SERPL-SCNC: 102 MMOL/L (ref 98–107)
CO2 SERPL-SCNC: 29 MMOL/L (ref 22–29)
CREAT SERPL-MCNC: 1.11 MG/DL (ref 0.76–1.27)
DEPRECATED RDW RBC AUTO: 47.8 FL (ref 37–54)
EGFRCR SERPLBLD CKD-EPI 2021: 68.8 ML/MIN/1.73
EOSINOPHIL # BLD AUTO: 0.15 10*3/MM3 (ref 0–0.4)
EOSINOPHIL NFR BLD AUTO: 2.9 % (ref 0.3–6.2)
ERYTHROCYTE [DISTWIDTH] IN BLOOD BY AUTOMATED COUNT: 14.4 % (ref 12.3–15.4)
GLOBULIN UR ELPH-MCNC: 2 GM/DL
GLUCOSE SERPL-MCNC: 84 MG/DL (ref 65–99)
HCT VFR BLD AUTO: 42.5 % (ref 37.5–51)
HGB BLD-MCNC: 14.7 G/DL (ref 13–17.7)
IMM GRANULOCYTES # BLD AUTO: 0.02 10*3/MM3 (ref 0–0.05)
IMM GRANULOCYTES NFR BLD AUTO: 0.4 % (ref 0–0.5)
LYMPHOCYTES # BLD AUTO: 1.91 10*3/MM3 (ref 0.7–3.1)
LYMPHOCYTES NFR BLD AUTO: 37.1 % (ref 19.6–45.3)
MCH RBC QN AUTO: 31.3 PG (ref 26.6–33)
MCHC RBC AUTO-ENTMCNC: 34.6 G/DL (ref 31.5–35.7)
MCV RBC AUTO: 90.6 FL (ref 79–97)
MONOCYTES # BLD AUTO: 0.49 10*3/MM3 (ref 0.1–0.9)
MONOCYTES NFR BLD AUTO: 9.5 % (ref 5–12)
NEUTROPHILS NFR BLD AUTO: 2.55 10*3/MM3 (ref 1.7–7)
NEUTROPHILS NFR BLD AUTO: 49.5 % (ref 42.7–76)
NRBC BLD AUTO-RTO: 0 /100 WBC (ref 0–0.2)
PLATELET # BLD AUTO: 241 10*3/MM3 (ref 140–450)
PMV BLD AUTO: 9.6 FL (ref 6–12)
POTASSIUM SERPL-SCNC: 3.4 MMOL/L (ref 3.5–5.2)
PROT SERPL-MCNC: 6.8 G/DL (ref 6–8.5)
QT INTERVAL: 413 MS
RBC # BLD AUTO: 4.69 10*6/MM3 (ref 4.14–5.8)
SODIUM SERPL-SCNC: 142 MMOL/L (ref 136–145)
WBC NRBC COR # BLD: 5.15 10*3/MM3 (ref 3.4–10.8)

## 2023-08-08 PROCEDURE — 80053 COMPREHEN METABOLIC PANEL: CPT

## 2023-08-08 PROCEDURE — 93005 ELECTROCARDIOGRAM TRACING: CPT

## 2023-08-08 PROCEDURE — 85025 COMPLETE CBC W/AUTO DIFF WBC: CPT

## 2023-08-08 PROCEDURE — 36415 COLL VENOUS BLD VENIPUNCTURE: CPT

## 2023-11-20 ENCOUNTER — TRANSCRIBE ORDERS (OUTPATIENT)
Dept: ADMINISTRATIVE | Facility: HOSPITAL | Age: 78
End: 2023-11-20
Payer: MEDICARE

## 2023-11-20 DIAGNOSIS — Z85.46 PERSONAL HISTORY OF PROSTATE CANCER: ICD-10-CM

## 2023-11-20 DIAGNOSIS — R97.21 INCREASING PROSTATE SPECIFIC ANTIGEN (PSA) LEVEL AFTER TREATMENT FOR MALIGNANT NEOPLASM OF PROSTATE: Primary | ICD-10-CM

## 2023-12-05 ENCOUNTER — HOSPITAL ENCOUNTER (OUTPATIENT)
Dept: PET IMAGING | Facility: HOSPITAL | Age: 78
Discharge: HOME | End: 2023-12-05
Payer: MEDICARE

## 2023-12-05 DIAGNOSIS — Z85.46 PERSONAL HISTORY OF PROSTATE CANCER: ICD-10-CM

## 2023-12-05 DIAGNOSIS — R97.21 INCREASING PROSTATE SPECIFIC ANTIGEN (PSA) LEVEL AFTER TREATMENT FOR MALIGNANT NEOPLASM OF PROSTATE: ICD-10-CM

## 2023-12-05 PROCEDURE — A9595 PIFLUFOLASTAT F 18 9 MCI SOLUTION PREFILLED SYRINGE: HCPCS | Performed by: UROLOGY

## 2023-12-05 PROCEDURE — 0 PIFLUFOLASTAT F 18 9 MCI SOLUTION PREFILLED SYRINGE: Performed by: UROLOGY

## 2023-12-05 PROCEDURE — 78815 PET IMAGE W/CT SKULL-THIGH: CPT

## 2023-12-05 RX ADMIN — PIFLUFOLASTAT F-18 1 DOSE: 80 INJECTION INTRAVENOUS at 11:49

## 2023-12-06 ENCOUNTER — OFFICE VISIT (OUTPATIENT)
Dept: INTERNAL MEDICINE | Facility: CLINIC | Age: 78
End: 2023-12-06
Payer: MEDICARE

## 2023-12-06 VITALS
HEIGHT: 67 IN | WEIGHT: 154 LBS | HEART RATE: 58 BPM | BODY MASS INDEX: 24.17 KG/M2 | OXYGEN SATURATION: 92 % | DIASTOLIC BLOOD PRESSURE: 82 MMHG | SYSTOLIC BLOOD PRESSURE: 128 MMHG

## 2023-12-06 DIAGNOSIS — I10 PRIMARY HYPERTENSION: Primary | Chronic | ICD-10-CM

## 2023-12-06 DIAGNOSIS — J43.8 OTHER EMPHYSEMA: Chronic | ICD-10-CM

## 2023-12-06 DIAGNOSIS — J30.1 SEASONAL ALLERGIC RHINITIS DUE TO POLLEN: Chronic | ICD-10-CM

## 2023-12-06 DIAGNOSIS — Z85.46 HISTORY OF PROSTATE CANCER: Chronic | ICD-10-CM

## 2023-12-06 DIAGNOSIS — N18.31 STAGE 3A CHRONIC KIDNEY DISEASE: Chronic | ICD-10-CM

## 2023-12-06 DIAGNOSIS — E78.2 MIXED HYPERLIPIDEMIA: Chronic | ICD-10-CM

## 2023-12-06 LAB
ALBUMIN SERPL-MCNC: 5 G/DL (ref 3.5–5.2)
ALBUMIN/GLOB SERPL: 2.5 G/DL
ALP SERPL-CCNC: 59 U/L (ref 39–117)
ALT SERPL-CCNC: 41 U/L (ref 1–41)
AST SERPL-CCNC: 33 U/L (ref 1–40)
BASOPHILS # BLD AUTO: 0.03 10*3/MM3 (ref 0–0.2)
BASOPHILS NFR BLD AUTO: 0.6 % (ref 0–1.5)
BILIRUB SERPL-MCNC: 0.7 MG/DL (ref 0–1.2)
BUN SERPL-MCNC: 24 MG/DL (ref 8–23)
BUN/CREAT SERPL: 20 (ref 7–25)
CALCIUM SERPL-MCNC: 10.2 MG/DL (ref 8.6–10.5)
CHLORIDE SERPL-SCNC: 102 MMOL/L (ref 98–107)
CHOLEST SERPL-MCNC: 150 MG/DL (ref 0–200)
CO2 SERPL-SCNC: 29.1 MMOL/L (ref 22–29)
CREAT SERPL-MCNC: 1.2 MG/DL (ref 0.76–1.27)
EGFRCR SERPLBLD CKD-EPI 2021: 62.7 ML/MIN/1.73
EOSINOPHIL # BLD AUTO: 0.16 10*3/MM3 (ref 0–0.4)
EOSINOPHIL NFR BLD AUTO: 3 % (ref 0.3–6.2)
ERYTHROCYTE [DISTWIDTH] IN BLOOD BY AUTOMATED COUNT: 12.3 % (ref 12.3–15.4)
GLOBULIN SER CALC-MCNC: 2 GM/DL
GLUCOSE SERPL-MCNC: 87 MG/DL (ref 65–99)
HCT VFR BLD AUTO: 43.6 % (ref 37.5–51)
HDLC SERPL-MCNC: 76 MG/DL (ref 40–60)
HGB BLD-MCNC: 14.9 G/DL (ref 13–17.7)
IMM GRANULOCYTES # BLD AUTO: 0.01 10*3/MM3 (ref 0–0.05)
IMM GRANULOCYTES NFR BLD AUTO: 0.2 % (ref 0–0.5)
LDLC SERPL CALC-MCNC: 62 MG/DL (ref 0–100)
LYMPHOCYTES # BLD AUTO: 2.03 10*3/MM3 (ref 0.7–3.1)
LYMPHOCYTES NFR BLD AUTO: 38.5 % (ref 19.6–45.3)
MCH RBC QN AUTO: 32.4 PG (ref 26.6–33)
MCHC RBC AUTO-ENTMCNC: 34.2 G/DL (ref 31.5–35.7)
MCV RBC AUTO: 94.8 FL (ref 79–97)
MONOCYTES # BLD AUTO: 0.38 10*3/MM3 (ref 0.1–0.9)
MONOCYTES NFR BLD AUTO: 7.2 % (ref 5–12)
NEUTROPHILS # BLD AUTO: 2.66 10*3/MM3 (ref 1.7–7)
NEUTROPHILS NFR BLD AUTO: 50.5 % (ref 42.7–76)
NRBC BLD AUTO-RTO: 0 /100 WBC (ref 0–0.2)
PLATELET # BLD AUTO: 236 10*3/MM3 (ref 140–450)
POTASSIUM SERPL-SCNC: 3.8 MMOL/L (ref 3.5–5.2)
PROT SERPL-MCNC: 7 G/DL (ref 6–8.5)
RBC # BLD AUTO: 4.6 10*6/MM3 (ref 4.14–5.8)
SODIUM SERPL-SCNC: 142 MMOL/L (ref 136–145)
TRIGL SERPL-MCNC: 58 MG/DL (ref 0–150)
TSH SERPL DL<=0.005 MIU/L-ACNC: 2.62 UIU/ML (ref 0.27–4.2)
VLDLC SERPL CALC-MCNC: 12 MG/DL (ref 5–40)
WBC # BLD AUTO: 5.27 10*3/MM3 (ref 3.4–10.8)

## 2023-12-06 RX ORDER — DIPHENOXYLATE HYDROCHLORIDE AND ATROPINE SULFATE 2.5; .025 MG/1; MG/1
TABLET ORAL DAILY
COMMUNITY

## 2023-12-06 RX ORDER — PREDNISOLONE ACETATE 10 MG/ML
SUSPENSION/ DROPS OPHTHALMIC
COMMUNITY
Start: 2023-09-25

## 2023-12-06 NOTE — ASSESSMENT & PLAN NOTE
COPD is improving with treatment.  Discussed monitoring symptoms and use of quick-relief medications and contacting us early in the course of exacerbations.  Warning signs of respiratory distress were reviewed with the patient.   Continue current medications

## 2023-12-06 NOTE — ASSESSMENT & PLAN NOTE
Recommended to avoid NSAIDs  Continue with hydration    Include Location In Plan?: Yes Detail Level: Generalized

## 2023-12-06 NOTE — PROGRESS NOTES
"Chief Complaint  Hypertension and Hyperlipidemia    Subjective        rGzegorz Price presents to [facility] PRIMARY CARE  This is a 76 y/o male presenting to office for f/u with chronic health conditions.     HTN-- continues on amlodipine, dyazide; denies any CP or heart palpitations.     HLD-- continues on statin therapy. Continues going to gym regularly.     Hx COPD-- continues on spiriva and advair, denies any SOA or RIVERS. Reports stable.     Reports struggling with some post nasal drip; has not been taking allergy medication. Denies fever or bloody congestion.   Sees dr. mahoney    Objective   Vital Signs:  /82 (BP Location: Left arm, Patient Position: Sitting, Cuff Size: Adult)   Pulse 58   Ht 170.2 cm (67.01\")   Wt 69.9 kg (154 lb)   SpO2 92%   BMI 24.11 kg/m²   Estimated body mass index is 24.11 kg/m² as calculated from the following:    Height as of this encounter: 170.2 cm (67.01\").    Weight as of this encounter: 69.9 kg (154 lb).       BMI is within normal parameters. No other follow-up for BMI required.      Physical Exam  Constitutional:       General: He is awake.      Appearance: Normal appearance.   HENT:      Head: Normocephalic and atraumatic.      Right Ear: Hearing and external ear normal.      Left Ear: Hearing and external ear normal.      Nose: Nose normal.      Mouth/Throat:      Lips: Pink.      Mouth: Mucous membranes are moist.      Pharynx: Oropharynx is clear.   Eyes:      Extraocular Movements: Extraocular movements intact.      Conjunctiva/sclera: Conjunctivae normal.      Pupils: Pupils are equal, round, and reactive to light.      Comments: +glasses   Neck:      Vascular: No carotid bruit.   Cardiovascular:      Rate and Rhythm: Normal rate and regular rhythm.      Pulses: Normal pulses.      Heart sounds: Normal heart sounds. No murmur heard.     No gallop.   Pulmonary:      Effort: Pulmonary effort is normal. No respiratory distress.      Breath sounds: " Normal breath sounds. No wheezing or rales.   Musculoskeletal:         General: No swelling. Normal range of motion.      Cervical back: Normal range of motion and neck supple.   Skin:     General: Skin is warm and dry.      Capillary Refill: Capillary refill takes less than 2 seconds.   Neurological:      General: No focal deficit present.      Mental Status: He is alert and oriented to person, place, and time. Mental status is at baseline.      Motor: Motor function is intact.      Coordination: Coordination is intact.      Gait: Gait is intact.      Deep Tendon Reflexes: Reflexes are normal and symmetric.   Psychiatric:         Attention and Perception: Attention normal.         Mood and Affect: Mood normal.         Speech: Speech normal.         Behavior: Behavior normal. Behavior is cooperative.         Thought Content: Thought content normal.         Cognition and Memory: Cognition normal.         Judgment: Judgment normal.        Result Review :  The following data was reviewed by: TEDDY Kelly on 12/06/2023:  Common labs          6/9/2023    08:53 8/8/2023    10:05   Common Labs   Glucose 80  84    BUN 28  19    Creatinine 1.30  1.11    Sodium 142  142    Potassium 3.8  3.4    Chloride 101  102    Calcium 10.2  9.8    Total Protein 6.7     Albumin 4.4  4.8    Total Bilirubin 0.6  0.7    Alkaline Phosphatase 44  47    AST (SGOT) 21  29    ALT (SGPT) 26  28    WBC 4.78  5.15    Hemoglobin 16.4  14.7    Hematocrit 47.6  42.5    Platelets 241  241    Total Cholesterol 260     Triglycerides 123     HDL Cholesterol 74     LDL Cholesterol  164                    Assessment and Plan   Diagnoses and all orders for this visit:    1. Primary hypertension (Primary)  Assessment & Plan:  Hypertension is improving with treatment.  Continue current treatment regimen.  Blood pressure will be reassessed at the next regular appointment.      2. Mixed hyperlipidemia  Assessment & Plan:  Lipid abnormalities are improving  with treatment.  Nutritional counseling was provided. and Pharmacotherapy as ordered.  Lipids will be reassessed in 6 months.    Orders:  -     Lipid panel  -     TSH Rfx On Abnormal To Free T4    3. Stage 3a chronic kidney disease  Assessment & Plan:  Recommended to avoid NSAIDs  Continue with hydration     Orders:  -     CBC & Differential  -     Comprehensive metabolic panel    4. History of prostate cancer  Assessment & Plan:  [facility]       5. Other emphysema  Assessment & Plan:  COPD is improving with treatment.  Discussed monitoring symptoms and use of quick-relief medications and contacting us early in the course of exacerbations.  Warning signs of respiratory distress were reviewed with the patient.   Continue current medications           6. Seasonal allergic rhinitis due to pollen  Assessment & Plan:  Recommend antihistamine daily  Recommended nasocort or flonase 1 spray each nostril daily               Follow Up   Return in about 6 months (around 6/6/2024) for Medicare Wellness.  Patient was given instructions and counseling regarding his condition or for health maintenance advice. Please see specific information pulled into the AVS if appropriate.

## 2023-12-12 DIAGNOSIS — I10 PRIMARY HYPERTENSION: Chronic | ICD-10-CM

## 2023-12-12 RX ORDER — TRIAMTERENE AND HYDROCHLOROTHIAZIDE 37.5; 25 MG/1; MG/1
CAPSULE ORAL
Qty: 90 CAPSULE | Refills: 1 | Status: SHIPPED | OUTPATIENT
Start: 2023-12-12

## 2023-12-12 RX ORDER — AMLODIPINE BESYLATE 10 MG/1
10 TABLET ORAL DAILY
Qty: 90 TABLET | Refills: 1 | Status: SHIPPED | OUTPATIENT
Start: 2023-12-12

## 2024-01-22 ENCOUNTER — HOSPITAL ENCOUNTER (OUTPATIENT)
Dept: CT IMAGING | Facility: HOSPITAL | Age: 79
Discharge: HOME | End: 2024-01-22
Admitting: STUDENT IN AN ORGANIZED HEALTH CARE EDUCATION/TRAINING PROGRAM
Payer: MEDICARE

## 2024-01-22 DIAGNOSIS — R91.8 GROUND GLASS OPACITY PRESENT ON IMAGING OF LUNG: ICD-10-CM

## 2024-01-22 PROCEDURE — 71250 CT THORAX DX C-: CPT

## 2024-01-24 NOTE — PROGRESS NOTES
"Chief Complaint  Follow up, lung nodules    Subjective        Grzegorz Price presents to [facility] THORACIC SURGERY  History of Present Illness    Mr. Price is a pleasant 78-year-old gentleman who is seen today in follow-up for a left lower lobe opacity.  He has been established in our practice with Dr. Giordano since May 2022 and has undergone surveillance since that time.  He is a former smoker with an approximate 40-pack-year history.  He has a history of prostate cancer diagnosed in 2016 and is followed by Dr. Dempsey of [facility] urology. His most recent PSA was elevated and he is scheduled for upcoming biopsy.    He underwent PET scan in December 2023 and a recent CT chest.  He has no new shortness of breath, cough, hemoptysis, unintentional weight loss, voice change or pleuritic pain.  He is active in his daily life and walks at least 5 miles per day.  He is a former .  He presents today in his baseline state of health with CT chest.      Objective   Vital Signs:  /82 (BP Location: Left arm, Patient Position: Sitting, Cuff Size: Adult)   Pulse 80   Ht 170.2 cm (67\")   Wt 68 kg (150 lb)   SpO2 92%   BMI 23.49 kg/m²   Estimated body mass index is 23.49 kg/m² as calculated from the following:    Height as of this encounter: 170.2 cm (67\").    Weight as of this encounter: 68 kg (150 lb).       BMI is within normal parameters. No other follow-up for BMI required.      Physical Exam  Constitutional:       General: He is not in acute distress.     Appearance: Normal appearance. He is not ill-appearing.   HENT:      Head: Normocephalic and atraumatic.      Nose: Nose normal.   Cardiovascular:      Rate and Rhythm: Normal rate.   Pulmonary:      Effort: Pulmonary effort is normal. No respiratory distress.   Musculoskeletal:         General: Normal range of motion.      Cervical back: Normal range of motion and neck supple.   Skin:     General: Skin is warm.   Neurological:      " General: No focal deficit present.      Mental Status: He is alert. Mental status is at baseline.   Psychiatric:         Mood and Affect: Mood normal.         Thought Content: Thought content normal.            Result Review :            I have independently reviewed the CT of the chest performed on 1/22/2024 which demonstrates a new 11 x 10 mm spiculated opacity in the left lower lobe with adjacent satellite micronodules.  There is a calcified nodule in the right apex.  No mediastinal or hilar lymphadenopathy.  No pleural or pericardial effusion.           Assessment and Plan     Diagnoses and all orders for this visit:    1. Lung nodule (Primary)  -     CT Chest Without Contrast; Future    2. Former smoker  -     CT Chest Without Contrast; Future    3. History of prostate cancer  -     CT Chest Without Contrast; Future    Mr. Price most recent CT chest demonstrates a new 11 mm nodule within the left lower lobe.  Based on appearance this is likely infectious/inflammatory but could also represent a malignancy.  Will order a short interval CT chest in 3 months for continued surveillance and have him follow-up with us in the office once this imaging is completed.  He is in agreement with this plan.       I spent 34 minutes caring for Grzegorz on this date of service. This time includes time spent by me in the following activities:preparing for the visit, reviewing tests, obtaining and/or reviewing a separately obtained history, performing a medically appropriate examination and/or evaluation , counseling and educating the patient/family/caregiver, ordering medications, tests, or procedures, documenting information in the medical record, independently interpreting results and communicating that information with the patient/family/caregiver, and care coordination  Follow Up     Return in about 3 months (around 4/25/2024) for Next scheduled follow up.  Patient was given instructions and counseling regarding his condition  or for health maintenance advice. Please see specific information pulled into the AVS if appropriate.

## 2024-01-25 ENCOUNTER — OFFICE VISIT (OUTPATIENT)
Dept: SURGERY | Facility: CLINIC | Age: 79
End: 2024-01-25
Payer: MEDICARE

## 2024-01-25 VITALS
HEIGHT: 67 IN | DIASTOLIC BLOOD PRESSURE: 82 MMHG | BODY MASS INDEX: 23.54 KG/M2 | SYSTOLIC BLOOD PRESSURE: 132 MMHG | OXYGEN SATURATION: 92 % | HEART RATE: 80 BPM | WEIGHT: 150 LBS

## 2024-01-25 DIAGNOSIS — Z87.891 FORMER SMOKER: ICD-10-CM

## 2024-01-25 DIAGNOSIS — Z85.46 HISTORY OF PROSTATE CANCER: ICD-10-CM

## 2024-01-25 DIAGNOSIS — R91.1 LUNG NODULE: Primary | ICD-10-CM

## 2024-01-25 PROCEDURE — 3079F DIAST BP 80-89 MM HG: CPT | Performed by: NURSE PRACTITIONER

## 2024-01-25 PROCEDURE — 3075F SYST BP GE 130 - 139MM HG: CPT | Performed by: NURSE PRACTITIONER

## 2024-01-25 PROCEDURE — 99214 OFFICE O/P EST MOD 30 MIN: CPT | Performed by: NURSE PRACTITIONER

## 2024-01-25 PROCEDURE — 1159F MED LIST DOCD IN RCRD: CPT | Performed by: NURSE PRACTITIONER

## 2024-01-25 PROCEDURE — 1160F RVW MEDS BY RX/DR IN RCRD: CPT | Performed by: NURSE PRACTITIONER

## 2024-03-12 DIAGNOSIS — I65.23 BILATERAL CAROTID ARTERY STENOSIS: Primary | ICD-10-CM

## 2024-06-04 ENCOUNTER — HOSPITAL ENCOUNTER (OUTPATIENT)
Facility: HOSPITAL | Age: 79
Discharge: HOME | End: 2024-06-04
Payer: MEDICARE

## 2024-06-04 DIAGNOSIS — Z87.891 FORMER SMOKER: ICD-10-CM

## 2024-06-04 DIAGNOSIS — Z85.46 HISTORY OF PROSTATE CANCER: ICD-10-CM

## 2024-06-04 DIAGNOSIS — R91.1 LUNG NODULE: ICD-10-CM

## 2024-06-04 PROCEDURE — 71250 CT THORAX DX C-: CPT

## 2024-06-08 DIAGNOSIS — I10 PRIMARY HYPERTENSION: Chronic | ICD-10-CM

## 2024-06-10 ENCOUNTER — OFFICE VISIT (OUTPATIENT)
Dept: INTERNAL MEDICINE | Facility: CLINIC | Age: 79
End: 2024-06-10
Payer: MEDICARE

## 2024-06-10 VITALS
BODY MASS INDEX: 24.61 KG/M2 | HEART RATE: 77 BPM | WEIGHT: 156.8 LBS | DIASTOLIC BLOOD PRESSURE: 78 MMHG | OXYGEN SATURATION: 98 % | SYSTOLIC BLOOD PRESSURE: 132 MMHG | HEIGHT: 67 IN | TEMPERATURE: 98.4 F

## 2024-06-10 DIAGNOSIS — Z85.46 HISTORY OF PROSTATE CANCER: ICD-10-CM

## 2024-06-10 DIAGNOSIS — I10 PRIMARY HYPERTENSION: ICD-10-CM

## 2024-06-10 DIAGNOSIS — N18.31 STAGE 3A CHRONIC KIDNEY DISEASE: ICD-10-CM

## 2024-06-10 DIAGNOSIS — J43.8 OTHER EMPHYSEMA: ICD-10-CM

## 2024-06-10 DIAGNOSIS — R91.1 LUNG NODULE: ICD-10-CM

## 2024-06-10 DIAGNOSIS — Z00.00 MEDICARE ANNUAL WELLNESS VISIT, SUBSEQUENT: Primary | ICD-10-CM

## 2024-06-10 DIAGNOSIS — E78.2 MIXED HYPERLIPIDEMIA: ICD-10-CM

## 2024-06-10 LAB
ALBUMIN SERPL-MCNC: 4.3 G/DL (ref 3.5–5.2)
ALBUMIN/GLOB SERPL: 2.2 G/DL
ALP SERPL-CCNC: 71 U/L (ref 39–117)
ALT SERPL-CCNC: 55 U/L (ref 1–41)
AST SERPL-CCNC: 40 U/L (ref 1–40)
BASOPHILS # BLD AUTO: 0.02 10*3/MM3 (ref 0–0.2)
BASOPHILS NFR BLD AUTO: 0.4 % (ref 0–1.5)
BILIRUB SERPL-MCNC: 0.5 MG/DL (ref 0–1.2)
BUN SERPL-MCNC: 25 MG/DL (ref 8–23)
BUN/CREAT SERPL: 23.4 (ref 7–25)
CALCIUM SERPL-MCNC: 9.5 MG/DL (ref 8.6–10.5)
CHLORIDE SERPL-SCNC: 102 MMOL/L (ref 98–107)
CHOLEST SERPL-MCNC: 131 MG/DL (ref 0–200)
CO2 SERPL-SCNC: 29.9 MMOL/L (ref 22–29)
CREAT SERPL-MCNC: 1.07 MG/DL (ref 0.76–1.27)
EGFRCR SERPLBLD CKD-EPI 2021: 71.5 ML/MIN/1.73
EOSINOPHIL # BLD AUTO: 0.24 10*3/MM3 (ref 0–0.4)
EOSINOPHIL NFR BLD AUTO: 4.6 % (ref 0.3–6.2)
ERYTHROCYTE [DISTWIDTH] IN BLOOD BY AUTOMATED COUNT: 13 % (ref 12.3–15.4)
GLOBULIN SER CALC-MCNC: 2 GM/DL
GLUCOSE SERPL-MCNC: 88 MG/DL (ref 65–99)
HCT VFR BLD AUTO: 37.3 % (ref 37.5–51)
HDLC SERPL-MCNC: 59 MG/DL (ref 40–60)
HGB BLD-MCNC: 12.7 G/DL (ref 13–17.7)
IMM GRANULOCYTES # BLD AUTO: 0.01 10*3/MM3 (ref 0–0.05)
IMM GRANULOCYTES NFR BLD AUTO: 0.2 % (ref 0–0.5)
LDLC SERPL CALC-MCNC: 59 MG/DL (ref 0–100)
LYMPHOCYTES # BLD AUTO: 0.58 10*3/MM3 (ref 0.7–3.1)
LYMPHOCYTES NFR BLD AUTO: 11 % (ref 19.6–45.3)
MCH RBC QN AUTO: 32 PG (ref 26.6–33)
MCHC RBC AUTO-ENTMCNC: 34 G/DL (ref 31.5–35.7)
MCV RBC AUTO: 94 FL (ref 79–97)
MONOCYTES # BLD AUTO: 0.52 10*3/MM3 (ref 0.1–0.9)
MONOCYTES NFR BLD AUTO: 9.9 % (ref 5–12)
NEUTROPHILS # BLD AUTO: 3.88 10*3/MM3 (ref 1.7–7)
NEUTROPHILS NFR BLD AUTO: 73.9 % (ref 42.7–76)
NRBC BLD AUTO-RTO: 0 /100 WBC (ref 0–0.2)
PLATELET # BLD AUTO: 266 10*3/MM3 (ref 140–450)
POTASSIUM SERPL-SCNC: 3.9 MMOL/L (ref 3.5–5.2)
PROT SERPL-MCNC: 6.3 G/DL (ref 6–8.5)
RBC # BLD AUTO: 3.97 10*6/MM3 (ref 4.14–5.8)
SODIUM SERPL-SCNC: 141 MMOL/L (ref 136–145)
TRIGL SERPL-MCNC: 60 MG/DL (ref 0–150)
TSH SERPL DL<=0.005 MIU/L-ACNC: 2.24 UIU/ML (ref 0.27–4.2)
VLDLC SERPL CALC-MCNC: 13 MG/DL (ref 5–40)
WBC # BLD AUTO: 5.25 10*3/MM3 (ref 3.4–10.8)

## 2024-06-10 PROCEDURE — 1126F AMNT PAIN NOTED NONE PRSNT: CPT | Performed by: NURSE PRACTITIONER

## 2024-06-10 PROCEDURE — 1159F MED LIST DOCD IN RCRD: CPT | Performed by: NURSE PRACTITIONER

## 2024-06-10 PROCEDURE — 1160F RVW MEDS BY RX/DR IN RCRD: CPT | Performed by: NURSE PRACTITIONER

## 2024-06-10 PROCEDURE — G0439 PPPS, SUBSEQ VISIT: HCPCS | Performed by: NURSE PRACTITIONER

## 2024-06-10 PROCEDURE — 99214 OFFICE O/P EST MOD 30 MIN: CPT | Performed by: NURSE PRACTITIONER

## 2024-06-10 PROCEDURE — 3078F DIAST BP <80 MM HG: CPT | Performed by: NURSE PRACTITIONER

## 2024-06-10 PROCEDURE — 3075F SYST BP GE 130 - 139MM HG: CPT | Performed by: NURSE PRACTITIONER

## 2024-06-10 PROCEDURE — 1170F FXNL STATUS ASSESSED: CPT | Performed by: NURSE PRACTITIONER

## 2024-06-10 RX ORDER — ATORVASTATIN CALCIUM 40 MG/1
40 TABLET, FILM COATED ORAL DAILY
COMMUNITY

## 2024-06-10 RX ORDER — TRIAMTERENE AND HYDROCHLOROTHIAZIDE 37.5; 25 MG/1; MG/1
CAPSULE ORAL
Qty: 90 CAPSULE | Refills: 1 | Status: SHIPPED | OUTPATIENT
Start: 2024-06-10

## 2024-06-10 RX ORDER — AMOXICILLIN AND CLAVULANATE POTASSIUM 875; 125 MG/1; MG/1
TABLET, FILM COATED ORAL 2 TIMES DAILY
COMMUNITY
Start: 2024-06-03

## 2024-06-10 RX ORDER — AMLODIPINE BESYLATE 10 MG/1
10 TABLET ORAL DAILY
Qty: 90 TABLET | Refills: 1 | Status: SHIPPED | OUTPATIENT
Start: 2024-06-10

## 2024-06-10 RX ORDER — ATORVASTATIN CALCIUM 40 MG/1
40 TABLET, FILM COATED ORAL DAILY
Qty: 90 TABLET | Refills: 3 | Status: SHIPPED | OUTPATIENT
Start: 2024-06-10 | End: 2024-06-10

## 2024-06-10 NOTE — ASSESSMENT & PLAN NOTE
[facility]   Has been receiving radiation tx; finished last tx last month  Continues on HRT

## 2024-06-10 NOTE — ASSESSMENT & PLAN NOTE
COPD is improving with treatment.    Plan:  Continue same medication/s without change.    Discussed medication dosage, use, side effects, and goals of treatment in detail.    Discussed monitoring symptoms and use of quick-relief medications and maintenance medication..    Patient Treatment Goals:   symptom prevention, minimizing limitation in activity, prevention of exacerbations and use of ER/inpatient care, maintenance of optimal pulmonary function, and minimization of adverse effects of treatment    Followup at the next regular appointment.

## 2024-06-10 NOTE — ASSESSMENT & PLAN NOTE
Lab today  The 10-year ASCVD risk score (Alberto HEDRICK, et al., 2019) is: 28%    Values used to calculate the score:      Age: 78 years      Sex: Male      Is Non- : No      Diabetic: No      Tobacco smoker: No      Systolic Blood Pressure: 132 mmHg      Is BP treated: Yes      HDL Cholesterol: 76 mg/dL      Total Cholesterol: 150 mg/dL  Continues on statin therapy

## 2024-06-10 NOTE — PROGRESS NOTES
The ABCs of the Annual Wellness Visit  Subsequent Medicare Wellness Visit    Subjective    Grzegorz Price is a 78 y.o. male who presents for a Subsequent Medicare Wellness Visit.    The following portions of the patient's history were reviewed and   updated as appropriate: allergies, current medications, past family history, past medical history, past social history, past surgical history, and problem list.    Compared to one year ago, the patient feels his physical   health is the same.    Compared to one year ago, the patient feels his mental   health is the same.    Recent Hospitalizations:  He was not admitted to the hospital during the last year.       Current Medical Providers:  Patient Care Team:  Honey Betts APRN as PCP - General (Internal Medicine)  Jered Solis MD as Referring Physician (Allergy and Immunology)  Yury Dempsey Jr., MD as Consulting Physician (Urology)  Eyal Carmona MD as Consulting Physician (Radiation Oncology)  Shavonne Larson DNP, TEDDY as Nurse Practitioner (Thoracic Surgery)  Reg Raza MD as Surgeon (Vascular Surgery)    Outpatient Medications Prior to Visit   Medication Sig Dispense Refill    ALBUTEROL SULFATE  (90 Base) MCG/ACT inhaler INHALE TWO PUFFS BY MOUTH EVERY 4 HOURS AS NEEDED FOR WHEEZING WHEN NECESSARY FOR DYSPNEA 1 inhaler 0    amoxicillin-clavulanate (AUGMENTIN) 875-125 MG per tablet Take  by mouth 2 (Two) Times a Day.      fluticasone-salmeterol (ADVAIR) 250-50 MCG/DOSE DISKUS 1 puff 2 (Two) Times a Day.      multivitamin (MULTIVITAMIN PO) Take  by mouth Daily.      prednisoLONE acetate (PRED FORTE) 1 % ophthalmic suspension       SPIRIVA HANDIHALER 18 MCG per inhalation capsule 1 capsule Daily.      amLODIPine (NORVASC) 10 MG tablet TAKE 1 TABLET BY MOUTH DAILY 90 tablet 1    atorvastatin (Lipitor) 40 MG tablet Take 1 tablet by mouth Daily. 30 tablet 11    triamterene-hydrochlorothiazide (DYAZIDE) 37.5-25 MG per capsule TAKE ONE  "CAPSULE BY MOUTH EVERY MORNING 90 capsule 1    atorvastatin (LIPITOR) 40 MG tablet Take 1 tablet by mouth Daily.       No facility-administered medications prior to visit.       No opioid medication identified on active medication list. I have reviewed chart for other potential  high risk medication/s and harmful drug interactions in the elderly.        Aspirin is not on active medication list.  Aspirin use is not indicated based on review of current medical condition/s. Risk of harm outweighs potential benefits.  .    Patient Active Problem List   Diagnosis    Screening for malignant neoplasm of colon    Secondary polycythemia    History of prostate cancer    Primary hypertension    Ground glass opacity present on imaging of lung    Seasonal allergic rhinitis due to pollen    History of chronic sinusitis    History of tobacco use    Personal history of colonic polyps    Other emphysema    Mixed hyperlipidemia    Stage 3a chronic kidney disease    Lung nodule     Advance Care Planning   Advance Care Planning     Advance Directive is not on file.  ACP discussion was held with the patient during this visit. Patient does not have an advance directive, information provided.     Objective    Vitals:    06/10/24 0845   BP: 132/78   BP Location: Right arm   Patient Position: Sitting   Cuff Size: Adult   Pulse: 77   Temp: 98.4 °F (36.9 °C)   SpO2: 98%   Weight: 71.1 kg (156 lb 12.8 oz)   Height: 170.2 cm (67\")     Estimated body mass index is 24.56 kg/m² as calculated from the following:    Height as of this encounter: 170.2 cm (67\").    Weight as of this encounter: 71.1 kg (156 lb 12.8 oz).    BMI is within normal parameters. No other follow-up for BMI required.      Does the patient have evidence of cognitive impairment? No          HEALTH RISK ASSESSMENT    Smoking Status:  Social History     Tobacco Use   Smoking Status Former    Current packs/day: 0.00    Average packs/day: 1 pack/day for 40.0 years (40.0 ttl pk-yrs) "    Types: Cigarettes    Start date:     Quit date:     Years since quitting: 10.4    Passive exposure: Past   Smokeless Tobacco Never     Alcohol Consumption:  Social History     Substance and Sexual Activity   Alcohol Use No     Fall Risk Screen:    JHONATAN Fall Risk Assessment was completed, and patient is at LOW risk for falls.Assessment completed on:6/10/2024    Depression Screenin/10/2024     8:51 AM   PHQ-2/PHQ-9 Depression Screening   Little Interest or Pleasure in Doing Things 0-->not at all   Feeling Down, Depressed or Hopeless 0-->not at all   PHQ-9: Brief Depression Severity Measure Score 0       Health Habits and Functional and Cognitive Screenin/10/2024     8:51 AM   Functional & Cognitive Status   Do you have difficulty preparing food and eating? No   Do you have difficulty bathing yourself, getting dressed or grooming yourself? No   Do you have difficulty using the toilet? No   Do you have difficulty moving around from place to place? No   Do you have trouble with steps or getting out of a bed or a chair? No   Current Diet Well Balanced Diet   Dental Exam Up to date   Eye Exam Up to date   Exercise (times per week) 0 times per week   Current Exercises Include No Regular Exercise   Do you need help using the phone?  No   Are you deaf or do you have serious difficulty hearing?  No   Do you need help to go to places out of walking distance? No   Do you need help shopping? No   Do you need help preparing meals?  No   Do you need help with housework?  No   Do you need help with laundry? No   Do you need help taking your medications? No   Do you need help managing money? No   Do you ever drive or ride in a car without wearing a seat belt? No   Have you felt unusual stress, anger or loneliness in the last month? No   Who do you live with? Other   If you need help, do you have trouble finding someone available to you? No   Have you been bothered in the last four weeks by sexual  problems? No   Do you have difficulty concentrating, remembering or making decisions? No       Age-appropriate Screening Schedule:  Refer to the list below for future screening recommendations based on patient's age, sex and/or medical conditions. Orders for these recommended tests are listed in the plan section. The patient has been provided with a written plan.    Health Maintenance   Topic Date Due    TDAP/TD VACCINES (1 - Tdap) 06/10/2024 (Originally 12/27/1964)    ZOSTER VACCINE (1 of 2) 06/10/2024 (Originally 12/27/1996)    COVID-19 Vaccine (4 - 2023-24 season) 08/30/2024 (Originally 9/1/2023)    INFLUENZA VACCINE  08/01/2024    LIPID PANEL  12/06/2024    LUNG CANCER SCREENING  06/04/2025    ANNUAL WELLNESS VISIT  06/10/2025    COLORECTAL CANCER SCREENING  04/27/2026    RSV Vaccine - Adults  Completed    Pneumococcal Vaccine 65+  Completed    HEPATITIS C SCREENING  Discontinued                  CMS Preventative Services Quick Reference  Risk Factors Identified During Encounter  Hearing Problem:  wears hearing aids   Immunizations Discussed/Encouraged: Tdap and Shingrix  Dental Screening Recommended  Vision Screening Recommended  The above risks/problems have been discussed with the patient.  Pertinent information has been shared with the patient in the After Visit Summary.  An After Visit Summary and PPPS were made available to the patient.    Follow Up:   Next Medicare Wellness visit to be scheduled in 1 year.       Additional E&M Note during same encounter follows:  Patient has multiple medical problems which are significant and separately identifiable that require additional work above and beyond the Medicare Wellness Visit.      Chief Complaint  Medicare Wellness-subsequent    Subjective        HPI  Grzegorz Price is also being seen today for chronic conditions.     HTN-- continues on amlodipine, triamterene/hydrochlorothiazide; denies CP or SOA.     HLD-- continues on statin therapy. Continues going to  "the gym regularly. Following healthy diet choices.     COPD-- continues on spiriva, advair; using albuterol sparingly; denies SOA or wheezing. Following with Dr. Solis with allergy/immunology    [facility]; had last radiation treatment last month. Last PSA 6.89 4/2024  Continues on HRT. Denies any hematuria.    Objective   Vital Signs:  /78 (BP Location: Right arm, Patient Position: Sitting, Cuff Size: Adult)   Pulse 77   Temp 98.4 °F (36.9 °C)   Ht 170.2 cm (67\")   Wt 71.1 kg (156 lb 12.8 oz)   SpO2 98%   BMI 24.56 kg/m²     Physical Exam  Constitutional:       Appearance: Normal appearance.   HENT:      Head: Normocephalic and atraumatic.      Right Ear: External ear normal.      Left Ear: External ear normal.      Nose: Nose normal.      Mouth/Throat:      Mouth: Mucous membranes are moist.      Pharynx: Oropharynx is clear.   Eyes:      Conjunctiva/sclera: Conjunctivae normal.      Pupils: Pupils are equal, round, and reactive to light.   Neck:      Vascular: Carotid bruit present.   Cardiovascular:      Rate and Rhythm: Normal rate and regular rhythm.      Pulses: Normal pulses.      Heart sounds: Normal heart sounds. No murmur heard.     No gallop.   Pulmonary:      Effort: Pulmonary effort is normal. No respiratory distress.      Breath sounds: Normal breath sounds. No stridor. No wheezing, rhonchi or rales.   Musculoskeletal:      Cervical back: Normal range of motion and neck supple.   Skin:     General: Skin is warm and dry.      Capillary Refill: Capillary refill takes less than 2 seconds.   Neurological:      Mental Status: He is alert and oriented to person, place, and time. Mental status is at baseline.   Psychiatric:         Mood and Affect: Mood normal.         Thought Content: Thought content normal.         Judgment: Judgment normal.          The following data was reviewed by: TEDDY Kelly on 06/10/2024:  Common labs          " 8/8/2023    10:05 12/6/2023    09:37   Common Labs   Glucose 84  87    BUN 19  24    Creatinine 1.11  1.20    Sodium 142  142    Potassium 3.4  3.8    Chloride 102  102    Calcium 9.8  10.2    Total Protein  7.0    Albumin 4.8  5.0    Total Bilirubin 0.7  0.7    Alkaline Phosphatase 47  59    AST (SGOT) 29  33    ALT (SGPT) 28  41    WBC 5.15  5.27    Hemoglobin 14.7  14.9    Hematocrit 42.5  43.6    Platelets 241  236    Total Cholesterol  150    Triglycerides  58    HDL Cholesterol  76    LDL Cholesterol   62      Tobacco Use: Medium Risk (6/10/2024)    Patient History     Smoking Tobacco Use: Former     Smokeless Tobacco Use: Never     Passive Exposure: Past     Social History     Substance and Sexual Activity   Alcohol Use No     Family History   Problem Relation Age of Onset    No Known Problems Mother     Throat cancer Father     Malig Hyperthermia Neg Hx                 Assessment and Plan   Diagnoses and all orders for this visit:    1. Medicare annual wellness visit, subsequent (Primary)    2. Primary hypertension  Assessment & Plan:  Hypertension is stable and controlled  Continue current treatment regimen.  Dietary sodium restriction.  Regular aerobic exercise.  Blood pressure will be reassessed in 6 months.    Orders:  -     CBC & Differential  -     Comprehensive metabolic panel    3. Mixed hyperlipidemia  Assessment & Plan:  Lab today  The 10-year ASCVD risk score (Alberto HEDRICK, et al., 2019) is: 28%    Values used to calculate the score:      Age: 78 years      Sex: Male      Is Non- : No      Diabetic: No      Tobacco smoker: No      Systolic Blood Pressure: 132 mmHg      Is BP treated: Yes      HDL Cholesterol: 76 mg/dL      Total Cholesterol: 150 mg/dL  Continues on statin therapy     Orders:  -     TSH Rfx On Abnormal To Free T4  -     Lipid panel    4. Stage 3a chronic kidney disease  Assessment & Plan:  Recommend to avoid NSAIDs  Hydration goal 64 ounces h20 daily  Labs  today    Orders:  -     CBC & Differential  -     Comprehensive metabolic panel    5. History of prostate cancer  Assessment & Plan:  [facility]   Has been receiving radiation tx; finished last tx last month  Continues on HRT      6. Other emphysema  Assessment & Plan:  COPD is improving with treatment.    Plan:  Continue same medication/s without change.    Discussed medication dosage, use, side effects, and goals of treatment in detail.    Discussed monitoring symptoms and use of quick-relief medications and maintenance medication..    Patient Treatment Goals:   symptom prevention, minimizing limitation in activity, prevention of exacerbations and use of ER/inpatient care, maintenance of optimal pulmonary function, and minimization of adverse effects of treatment    Followup at the next regular appointment.        7. Lung nodule  Assessment & Plan:  Following with thoracic  Under surveillance               Follow Up   Return in about 6 months (around 12/10/2024) for Recheck chronic conditions.  Patient was given instructions and counseling regarding his condition or for health maintenance advice. Please see specific information pulled into the AVS if appropriate.

## 2024-06-12 DIAGNOSIS — D64.9 LOW HEMOGLOBIN: Primary | ICD-10-CM

## 2024-06-12 NOTE — PROGRESS NOTES
Please ask patient to return to lab to recheck iron stores. Please ask if he's noticed any blood in his stool or urine. Did he donate blood recently??  Hemoglobin was down to 12.7 which is a two point drop since 6 months ago.   Return to lab in 1-2 weeks

## 2024-06-14 NOTE — PROGRESS NOTES
Patient advised.     Patient has not noticed blood in urine or stool. And patient has not donated blood

## 2024-06-25 DIAGNOSIS — D64.9 LOW HEMOGLOBIN: Primary | ICD-10-CM

## 2024-07-02 DIAGNOSIS — D64.9 LOW HEMOGLOBIN: ICD-10-CM

## 2024-07-02 DIAGNOSIS — D64.9 LOW HEMOGLOBIN: Primary | ICD-10-CM

## 2024-07-02 LAB — HEMOCCULT STL QL IA: NEGATIVE

## 2024-07-02 PROCEDURE — 82274 ASSAY TEST FOR BLOOD FECAL: CPT | Performed by: NURSE PRACTITIONER

## 2024-07-05 ENCOUNTER — TELEPHONE (OUTPATIENT)
Dept: INTERNAL MEDICINE | Facility: CLINIC | Age: 79
End: 2024-07-05
Payer: MEDICARE

## 2024-07-05 NOTE — TELEPHONE ENCOUNTER
Called pt to see if he had questions regarding labs as pt viewed on MyChart. Lvm that pt needs to sched 2 week lab recheck

## 2024-07-05 NOTE — TELEPHONE ENCOUNTER
Name: Grzegorz Price    Relationship: Self    Best Callback Number: [phone]    HUB PROVIDED THE RELAY MESSAGE FROM THE OFFICE   PATIENT HAS FURTHER QUESTIONS AND WOULD LIKE A CALL BACK AT THE FOLLOWING PHONE NUMBER [phone]    ADDITIONAL INFORMATION:PATIENT HAD NO FURTHER QUESTIONS BUT WOULD LIKE A CALLBACK TO GET LABS SCHEDULED.

## 2024-07-10 NOTE — PROGRESS NOTES
"Chief Complaint  Follow up, lung nodules    Subjective        Grzegorz Price presents to [facility] THORACIC SURGERY  History of Present Illness    Mr. Priec is a pleasant 78-year-old gentleman who is seen today in follow-up for a left lower lobe opacity.  He has been established in our practice with Dr. Giordano since May 2022 and has undergone surveillance since that time.  He is a former smoker with an approximate 40-pack-year history.  He has a history of prostate cancer diagnosed in 2016 and is followed by Dr. Dempsey of [facility] urology.  He has recently completed radiation.     He had a PET scan in December 2023.  There was metabolic activity within the prostate but no evidence of activity within the chest.  He has no new pulmonary complaints.  He continues to walk ~5 miles per day and is a former .  He presents today feeling well with CT chest.  He has had some sinus congestion and recently completed Augmentin.      Objective   Vital Signs:  /78 (BP Location: Left arm, Patient Position: Sitting, Cuff Size: Adult)   Pulse 59   Ht 170.2 cm (67\")   Wt 70.9 kg (156 lb 6.4 oz)   SpO2 95%   BMI 24.50 kg/m²   Estimated body mass index is 24.5 kg/m² as calculated from the following:    Height as of this encounter: 170.2 cm (67\").    Weight as of this encounter: 70.9 kg (156 lb 6.4 oz).       BMI is within normal parameters. No other follow-up for BMI required.      Physical Exam  Constitutional:       General: He is not in acute distress.     Appearance: Normal appearance. He is not ill-appearing.   HENT:      Head: Normocephalic and atraumatic.      Nose: Nose normal.   Cardiovascular:      Rate and Rhythm: Normal rate.   Pulmonary:      Effort: Pulmonary effort is normal. No respiratory distress.   Musculoskeletal:         General: Normal range of motion.      Cervical back: Normal range of motion and neck supple.   Skin:     General: Skin is warm.   Neurological:      General: " No focal deficit present.      Mental Status: He is alert. Mental status is at baseline.   Psychiatric:         Mood and Affect: Mood normal.         Thought Content: Thought content normal.            Result Review :      CT chest 06/04/2024: Opacity within the left lower lobe has resolved.  No new or enlarging pulmonary nodules.  No lymphadenopathy or pleural effusion.    CT chest 1/22/2024:  a new 11 x 10 mm spiculated opacity in the left lower lobe with adjacent satellite micronodules.  There is a calcified nodule in the right apex.  No mediastinal or hilar lymphadenopathy.  No pleural or pericardial effusion.           Assessment and Plan     Diagnoses and all orders for this visit:    1. History of prostate cancer (Primary)  -     CT Chest Low Dose Follow Up Without Contrast; Future    2. Former smoker  -     CT Chest Low Dose Follow Up Without Contrast; Future      Mr. Price most recent CT chest demonstrates resolution of previously seen left lower lobe density.  This was likely infectious/inflammatory.  Given his smoking history and history of prostate cancer, recommend annual surveillance scans for lung cancer screening.  Imaging was reviewed with him today and we will follow-up with him in 1 year with CT chest.       I spent 31 minutes caring for Grzegorz on this date of service. This time includes time spent by me in the following activities:preparing for the visit, reviewing tests, obtaining and/or reviewing a separately obtained history, performing a medically appropriate examination and/or evaluation , counseling and educating the patient/family/caregiver, ordering medications, tests, or procedures, documenting information in the medical record, independently interpreting results and communicating that information with the patient/family/caregiver, and care coordination  Follow Up     Return in about 1 year (around 7/11/2025) for Next scheduled follow up.  Patient was given instructions and counseling  regarding his condition or for health maintenance advice. Please see specific information pulled into the AVS if appropriate.

## 2024-07-11 ENCOUNTER — OFFICE VISIT (OUTPATIENT)
Dept: SURGERY | Facility: CLINIC | Age: 79
End: 2024-07-11
Payer: MEDICARE

## 2024-07-11 VITALS
HEIGHT: 67 IN | OXYGEN SATURATION: 95 % | BODY MASS INDEX: 24.55 KG/M2 | DIASTOLIC BLOOD PRESSURE: 78 MMHG | SYSTOLIC BLOOD PRESSURE: 142 MMHG | HEART RATE: 59 BPM | WEIGHT: 156.4 LBS

## 2024-07-11 DIAGNOSIS — Z87.891 FORMER SMOKER: ICD-10-CM

## 2024-07-11 DIAGNOSIS — Z85.46 HISTORY OF PROSTATE CANCER: Primary | ICD-10-CM

## 2024-07-11 RX ORDER — LANOLIN ALCOHOL/MO/W.PET/CERES
1000 CREAM (GRAM) TOPICAL DAILY
COMMUNITY

## 2024-07-12 DIAGNOSIS — D64.9 LOW HEMOGLOBIN: ICD-10-CM

## 2024-07-15 ENCOUNTER — TELEPHONE (OUTPATIENT)
Dept: INTERNAL MEDICINE | Facility: CLINIC | Age: 79
End: 2024-07-15
Payer: MEDICARE

## 2024-07-15 NOTE — TELEPHONE ENCOUNTER
Hub staff attempted to follow warm transfer process and was unsuccessful     Caller: Grzegorz Price    Relationship to patient: Self    Best call back number:     [phone]        Patient is needing:     PATIENT IS NEEDING TO RESCHEDULE HIS LAB APPOINTMENT.  HIS APPOINTMENT WAS FOR TODAY    PLEASE RETURN HIS CALL

## 2024-07-21 PROBLEM — J30.2 SEASONAL ALLERGIES: Status: ACTIVE | Noted: 2024-07-21

## 2024-07-21 PROBLEM — I65.29 CAROTID ARTERY STENOSIS: Status: ACTIVE | Noted: 2024-07-21

## 2024-07-22 ENCOUNTER — OFFICE VISIT (OUTPATIENT)
Age: 79
End: 2024-07-22
Payer: MEDICARE

## 2024-07-22 ENCOUNTER — HOSPITAL ENCOUNTER (OUTPATIENT)
Facility: HOSPITAL | Age: 79
Discharge: HOME | End: 2024-07-22
Admitting: NURSE PRACTITIONER
Payer: MEDICARE

## 2024-07-22 VITALS
SYSTOLIC BLOOD PRESSURE: 130 MMHG | HEIGHT: 67 IN | DIASTOLIC BLOOD PRESSURE: 70 MMHG | BODY MASS INDEX: 24.48 KG/M2 | WEIGHT: 156 LBS

## 2024-07-22 DIAGNOSIS — I65.23 BILATERAL CAROTID ARTERY STENOSIS: Primary | ICD-10-CM

## 2024-07-22 DIAGNOSIS — I65.23 BILATERAL CAROTID ARTERY STENOSIS: ICD-10-CM

## 2024-07-22 LAB
BH CV XLRA MEAS LEFT CAROTID BULB EDV: -77.8 CM/SEC
BH CV XLRA MEAS LEFT CAROTID BULB PSV: -472.9 CM/SEC
BH CV XLRA MEAS LEFT DIST CCA EDV: -45 CM/SEC
BH CV XLRA MEAS LEFT DIST CCA PSV: -165.7 CM/SEC
BH CV XLRA MEAS LEFT DIST ICA EDV: -45 CM/SEC
BH CV XLRA MEAS LEFT DIST ICA PSV: -107.6 CM/SEC
BH CV XLRA MEAS LEFT ICA/CCA RATIO: 2.85
BH CV XLRA MEAS LEFT MID CCA EDV: 22.7 CM/SEC
BH CV XLRA MEAS LEFT MID CCA PSV: 69 CM/SEC
BH CV XLRA MEAS LEFT MID ICA EDV: -54.9 CM/SEC
BH CV XLRA MEAS LEFT MID ICA PSV: -150.5 CM/SEC
BH CV XLRA MEAS LEFT PROX CCA EDV: 16.5 CM/SEC
BH CV XLRA MEAS LEFT PROX CCA PSV: 60.4 CM/SEC
BH CV XLRA MEAS LEFT PROX ECA EDV: 28.5 CM/SEC
BH CV XLRA MEAS LEFT PROX ECA PSV: 318.3 CM/SEC
BH CV XLRA MEAS LEFT PROX ICA EDV: -61.5 CM/SEC
BH CV XLRA MEAS LEFT PROX ICA PSV: -201.9 CM/SEC
BH CV XLRA MEAS LEFT PROX SCLA PSV: 225.8 CM/SEC
BH CV XLRA MEAS LEFT VERTEBRAL A EDV: -11.8 CM/SEC
BH CV XLRA MEAS LEFT VERTEBRAL A PSV: -53.3 CM/SEC
BH CV XLRA MEAS RIGHT CAROTID BULB EDV: -30 CM/SEC
BH CV XLRA MEAS RIGHT CAROTID BULB PSV: -111.4 CM/SEC
BH CV XLRA MEAS RIGHT DIST CCA EDV: -30.3 CM/SEC
BH CV XLRA MEAS RIGHT DIST CCA PSV: -114.4 CM/SEC
BH CV XLRA MEAS RIGHT DIST ICA EDV: -28.6 CM/SEC
BH CV XLRA MEAS RIGHT DIST ICA PSV: -86.6 CM/SEC
BH CV XLRA MEAS RIGHT ICA/CCA RATIO: 1.34
BH CV XLRA MEAS RIGHT MID CCA EDV: 28.6 CM/SEC
BH CV XLRA MEAS RIGHT MID CCA PSV: 111.8 CM/SEC
BH CV XLRA MEAS RIGHT MID ICA EDV: -37.3 CM/SEC
BH CV XLRA MEAS RIGHT MID ICA PSV: -141.6 CM/SEC
BH CV XLRA MEAS RIGHT PROX CCA EDV: 23.4 CM/SEC
BH CV XLRA MEAS RIGHT PROX CCA PSV: 98.8 CM/SEC
BH CV XLRA MEAS RIGHT PROX ECA EDV: -16.5 CM/SEC
BH CV XLRA MEAS RIGHT PROX ECA PSV: -171.5 CM/SEC
BH CV XLRA MEAS RIGHT PROX ICA EDV: -37.3 CM/SEC
BH CV XLRA MEAS RIGHT PROX ICA PSV: -153.7 CM/SEC
BH CV XLRA MEAS RIGHT PROX SCLA PSV: 139.4 CM/SEC
BH CV XLRA MEAS RIGHT VERTEBRAL A EDV: -19.9 CM/SEC
BH CV XLRA MEAS RIGHT VERTEBRAL A PSV: -74.5 CM/SEC
LEFT ARM BP: NORMAL MMHG
RIGHT ARM BP: NORMAL MMHG

## 2024-07-22 PROCEDURE — 1160F RVW MEDS BY RX/DR IN RCRD: CPT | Performed by: SURGERY

## 2024-07-22 PROCEDURE — 3078F DIAST BP <80 MM HG: CPT | Performed by: SURGERY

## 2024-07-22 PROCEDURE — 99214 OFFICE O/P EST MOD 30 MIN: CPT | Performed by: SURGERY

## 2024-07-22 PROCEDURE — 1159F MED LIST DOCD IN RCRD: CPT | Performed by: SURGERY

## 2024-07-22 PROCEDURE — 93880 EXTRACRANIAL BILAT STUDY: CPT | Performed by: SURGERY

## 2024-07-22 PROCEDURE — 3075F SYST BP GE 130 - 139MM HG: CPT | Performed by: SURGERY

## 2024-07-22 PROCEDURE — 93880 EXTRACRANIAL BILAT STUDY: CPT

## 2024-07-22 NOTE — PROGRESS NOTES
"Chief Complaint  Carotid Artery Disease    Subjective          HPI: Grzegorz Price presents to [facility] VASCULAR SURGERY   History of Present Illness  The patient presents for evaluation of neck pain.    He underwent radiation therapy for his prostate cancer on 06/22/2023. His PSA level increased to 38 in 08/2023, leading him to undergo surgery on 01/17/2024. However, the procedure in 08/2023 was unsuccessful. He attributes the radiation therapy to alleviating his night sweats. He is not currently taking aspirin or Plavix. He discontinued aspirin a week ago due to bruising. He denies any hematuria. He denies any symptoms of a stroke such as unilateral weakness, speech difficulty, or slurred speech. He has no history of head or neck surgery. He received radiation therapy on his pelvis. He maintains an active lifestyle, walking in his basement almost every morning. He walked 20,000 steps on Saturday and Sunday. He does not have a cardiologist. He maintains an active lifestyle, going to the gym 3 to 4 days a week. However, he has not visited the gym for a week due to car shops, where he walked 20 miles in 2 days. He experiences fatigue after 10 to 15 minutes of yard work.    Supplemental Information  He has COPD. He has erectile dysfunction.    Review of Systems     History Review Reviewed Comments   Past Medical History:  [x]  Chronic kidney disease 3 8, COPD, hyperlipidemia   Past Surgical History: [x]  Prostate cancer treatment   Family History: [x]     Social History: [x]       Objective   Vital Signs:  /70   Ht 170.2 cm (67\")   Wt 70.8 kg (156 lb)   BMI 24.43 kg/m²   Estimated body mass index is 24.43 kg/m² as calculated from the following:    Height as of this encounter: 170.2 cm (67\").    Weight as of this encounter: 70.8 kg (156 lb).  BMI is within normal parameters. No other follow-up for BMI required.          Physical Exam  Physical Exam  Neck shows no scars. Cervical mobility is " good.  Right and left radial pulses are palpable.        Result Review :    Common labs          6/10/2024    10:02 6/24/2024    09:16 7/1/2024    10:22   Common Labs   Glucose 88      BUN 25      Creatinine 1.07      Sodium 141      Potassium 3.9      Chloride 102      Calcium 9.5      Total Protein 6.3      Albumin 4.3      Total Bilirubin 0.5      Alkaline Phosphatase 71      AST (SGOT) 40      ALT (SGPT) 55      WBC 5.25  3.66  3.33    Hemoglobin 12.7  12.0  12.1    Hematocrit 37.3  36.5  36.6    Platelets 266  265  255    Total Cholesterol 131      Triglycerides 60      HDL Cholesterol 59      LDL Cholesterol  59        Results  Laboratory Studies  PSA was 38.    Imaging  Severe narrowing in the neck artery, left side at 70% or greater.     Most notable findings include: Creatinine 1.07.  Hemoglobin 12.1.  Hematocrit 36.6.  LDL 59.        Grzegorz Price  reports that he quit smoking about 10 years ago. His smoking use included cigarettes. He started smoking about 50 years ago. He has a 40 pack-year smoking history. He has been exposed to tobacco smoke. He has never used smokeless tobacco..        Patient or patient representative verbalized consent for the use of Ambient Listening during the visit with  Reg Raza MD for chart documentation. 7/22/2024  13:01 EDT        Assessment and Plan     Assessment & Plan  Bilateral carotid artery stenosis    Orders Placed This Encounter   Procedures    CT Angiogram Carotids           Assessment & Plan  1. Severe neck narrowing.  Severe narrowing, particularly on the left side, is observed, measuring approximately 70 percent or greater. This measurement, similar numbers from the last check, may be slightly higher. Aspirin 81 mg was recommended to lower his risk of stroke. The risks, benefits, and alternatives of surgical intervention were discussed. A CT scan of the neck was ordered. Should he decide to proceed with surgery, an ultrasound will be scheduled in 6  months or 1 year. If he is open to considering surgery, a CAT scan of the neck arteries will be ordered.    78-year-old gentleman who I have followed for carotid artery disease.  His medical records were reviewed.  In summary, he has had moderate to severe stenosis since he was initially evaluated but has been asymptomatic and favored medical therapy especially in the setting of ongoing workup and management of his prostate cancer which has been recurrent in nature.  He stopped taking his aspirin a couple of weeks ago and I asked him to restart that.  His last duplex scan results were reviewed.  Left-sided velocities were 432/76 with a ratio 4.1.  Today's ultrasound shows a velocity of 473/78 with a ratio of 6.8.  I classify this as a greater than 70% stenosis and it may even represent a progression of that stenosis based on the velocities although they are somewhat similar.    I had a long conversation with the patient regarding his disease and management.  He had to be redirected a fair amount when taking some of his history and during a discussion of the risk, benefits, and alternatives to medical versus interventional management.  Ultimately, I have ordered a CT angiogram of the head and neck to better evaluate his anatomy.  Based on this we can make further recommendations about how to proceed.       Follow Up     Return in about 1 month (around 8/22/2024).  Patient was given instructions and counseling regarding his condition or for health maintenance advice. Please see specific information pulled into the AVS if appropriate.

## 2024-07-24 LAB
BASOPHILS # BLD AUTO: 0.02 10*3/MM3 (ref 0–0.2)
BASOPHILS NFR BLD AUTO: 0.6 % (ref 0–1.5)
EOSINOPHIL # BLD AUTO: 0.26 10*3/MM3 (ref 0–0.4)
EOSINOPHIL NFR BLD AUTO: 7.9 % (ref 0.3–6.2)
ERYTHROCYTE [DISTWIDTH] IN BLOOD BY AUTOMATED COUNT: 13 % (ref 12.3–15.4)
HCT VFR BLD AUTO: 36.5 % (ref 37.5–51)
HGB BLD-MCNC: 12.2 G/DL (ref 13–17.7)
IMM GRANULOCYTES # BLD AUTO: 0 10*3/MM3 (ref 0–0.05)
IMM GRANULOCYTES NFR BLD AUTO: 0 % (ref 0–0.5)
LYMPHOCYTES # BLD AUTO: 0.76 10*3/MM3 (ref 0.7–3.1)
LYMPHOCYTES NFR BLD AUTO: 23.2 % (ref 19.6–45.3)
MCH RBC QN AUTO: 32.2 PG (ref 26.6–33)
MCHC RBC AUTO-ENTMCNC: 33.4 G/DL (ref 31.5–35.7)
MCV RBC AUTO: 96.3 FL (ref 79–97)
MONOCYTES # BLD AUTO: 0.32 10*3/MM3 (ref 0.1–0.9)
MONOCYTES NFR BLD AUTO: 9.8 % (ref 5–12)
NEUTROPHILS # BLD AUTO: 1.92 10*3/MM3 (ref 1.7–7)
NEUTROPHILS NFR BLD AUTO: 58.5 % (ref 42.7–76)
NRBC BLD AUTO-RTO: 0 /100 WBC (ref 0–0.2)
PLATELET # BLD AUTO: 277 10*3/MM3 (ref 140–450)
RBC # BLD AUTO: 3.79 10*6/MM3 (ref 4.14–5.8)
WBC # BLD AUTO: 3.28 10*3/MM3 (ref 3.4–10.8)

## 2024-07-25 DIAGNOSIS — D64.9 LOW HEMOGLOBIN: Primary | ICD-10-CM

## 2024-08-15 ENCOUNTER — HOSPITAL ENCOUNTER (OUTPATIENT)
Dept: CT IMAGING | Facility: HOSPITAL | Age: 79
Discharge: HOME | End: 2024-08-15
Admitting: SURGERY
Payer: MEDICARE

## 2024-08-15 DIAGNOSIS — I65.23 BILATERAL CAROTID ARTERY STENOSIS: ICD-10-CM

## 2024-08-15 PROCEDURE — 70498 CT ANGIOGRAPHY NECK: CPT

## 2024-08-15 PROCEDURE — 25510000001 IOPAMIDOL PER 1 ML: Performed by: SURGERY

## 2024-08-15 RX ADMIN — IOPAMIDOL 95 ML: 755 INJECTION, SOLUTION INTRAVENOUS at 14:50

## 2024-08-19 ENCOUNTER — TELEPHONE (OUTPATIENT)
Dept: INTERNAL MEDICINE | Facility: CLINIC | Age: 79
End: 2024-08-19
Payer: MEDICARE

## 2024-08-19 DIAGNOSIS — J30.1 SEASONAL ALLERGIC RHINITIS DUE TO POLLEN: Primary | ICD-10-CM

## 2024-08-19 RX ORDER — AZELASTINE 1 MG/ML
2 SPRAY, METERED NASAL 2 TIMES DAILY
Qty: 30 ML | Refills: 12 | Status: SHIPPED | OUTPATIENT
Start: 2024-08-19

## 2024-08-20 NOTE — TELEPHONE ENCOUNTER
Caller: Grzegorz Price    Relationship: Self    Best call back number: [phone]     What medication are you requesting: AZELASTINE NASAL SPRAY    If a prescription is needed, what is your preferred pharmacy and phone number: [facility] [id number] - [place], [place] - [address] AT [facility] - [phone]  - [phone]     Additional notes:PATIENT CALLED AND WAS ADVISED THIS IS OVER THE COUNTER BUT WHEN HE GOT TO THE PHARMACY IT IS PRESCRIPTION ONLY.    PLEASE CALL IN A PRESCRIPTION ASAP    ”   
Patient advised   
sent  
[FreeTextEntry1] : Mr. Corrales is a 59 year old male with a history of allergic rhinitis, asthma, abnormal chest CT, and GERD presenting to the office today for a follow up visit. His chief complaint is\par \par -s/p evaluation with Alvin Stubbs \par -he notes dysphonia \par -he notes that a few years ago he was able to use Xopenex after talking for extended periods to relieve mucus accumulation, but this is no longer effective\par -he notes wheezing\par -he note dysphagia when eating or drinking too fast\par -he notes sneezing fits (~20 sneezes) after taking a bite of food at times\par -he notes more SOB and coughing on exertion\par -he notes that his sleep seems to be improving and he is unsure why\par -he notes that he is on gabapentin \par -he notes that he is awaiting approval for speech therapy\par \par -patient denies any headaches, nausea, vomiting, fever, chills, sweats, chest pain, chest pressure, palpitations, diarrhea, constipation, myalgias, dizziness, leg swelling, leg pain, itchy eyes, itchy ears

## 2024-08-26 DIAGNOSIS — D64.9 LOW HEMOGLOBIN: Primary | ICD-10-CM

## 2024-09-04 ENCOUNTER — TELEPHONE (OUTPATIENT)
Dept: INTERNAL MEDICINE | Facility: CLINIC | Age: 79
End: 2024-09-04
Payer: MEDICARE

## 2024-09-04 NOTE — TELEPHONE ENCOUNTER
"----- Message from Honey Betts sent at 9/3/2024  2:45 PM EDT -----  Please make sure patient is aware-- I got a response saying he did not see this  \"  Mr Price,     Your CBC has improved-- now at 12.7, let's recheck again in 4 weeks. Please reach out and we will schedule this.\"  "

## 2024-09-09 ENCOUNTER — OFFICE VISIT (OUTPATIENT)
Age: 79
End: 2024-09-09
Payer: MEDICARE

## 2024-09-09 VITALS
DIASTOLIC BLOOD PRESSURE: 78 MMHG | BODY MASS INDEX: 24.48 KG/M2 | WEIGHT: 156 LBS | HEIGHT: 67 IN | HEART RATE: 64 BPM | SYSTOLIC BLOOD PRESSURE: 155 MMHG

## 2024-09-09 DIAGNOSIS — I65.23 CAROTID STENOSIS, BILATERAL: Primary | ICD-10-CM

## 2024-09-09 PROCEDURE — 1160F RVW MEDS BY RX/DR IN RCRD: CPT | Performed by: SURGERY

## 2024-09-09 PROCEDURE — G2211 COMPLEX E/M VISIT ADD ON: HCPCS | Performed by: SURGERY

## 2024-09-09 PROCEDURE — 99213 OFFICE O/P EST LOW 20 MIN: CPT | Performed by: SURGERY

## 2024-09-09 PROCEDURE — 1159F MED LIST DOCD IN RCRD: CPT | Performed by: SURGERY

## 2024-09-09 PROCEDURE — 3078F DIAST BP <80 MM HG: CPT | Performed by: SURGERY

## 2024-09-09 PROCEDURE — 3077F SYST BP >= 140 MM HG: CPT | Performed by: SURGERY

## 2024-09-09 NOTE — PROGRESS NOTES
"Chief Complaint  Carotid Artery Disease    Subjective          HPI: Grzegorz Price presents to [facility] VASCULAR SURGERY   History of Present Illness  The patient is here today for a discussion regarding his carotid artery. An ultrasound revealed a narrowing, prompting the order of a CAT scan.    He reports no changes in his health or medication regimen since his last visit. He is not experiencing any symptoms indicative of a stroke or mini-stroke, such as weakness on one side of the body, difficulty speaking, slurred speech, or temporary blindness in one eye. His mobility remains unimpaired.    He has resumed taking aspirin, although he expresses a dislike for it due to increased bruising. He also mentions that he has undergone radiation therapy twice for prostate cancer.    Review of Systems     History Review Reviewed Comments   Past Medical History:  [x]     Past Surgical History: [x]     Family History: [x]     Social History: [x]       Objective   Vital Signs:  /78   Pulse 64   Ht 170.2 cm (67\")   Wt 70.8 kg (156 lb)   BMI 24.43 kg/m²   Estimated body mass index is 24.43 kg/m² as calculated from the following:    Height as of this encounter: 170.2 cm (67\").    Weight as of this encounter: 70.8 kg (156 lb).  BMI is within normal parameters. No other follow-up for BMI required.          Physical Exam  Physical Exam    Left-sided carotid bruit.  Somewhat reduced cervical mobility.  No signs of head neck radiation      Result Review :    Common labs          7/1/2024    10:22 7/24/2024    10:19 8/23/2024    10:13   Common Labs   WBC 3.33  3.28  3.90    Hemoglobin 12.1  12.2  12.7    Hematocrit 36.6  36.5  37.4    Platelets 255  277  233      Results  Imaging  Carotid duplex scan showed plaque on the left side with velocities 473/77, suggesting a greater than 70% stenosis. CAT scan indicated a 60 to 65% narrowing of the left internal carotid artery.     Most notable findings include: " Patient's velocities were potentially concerning for a greater than 70% stenosis but CT angiogram images reviewed and agree with radiologist interpretation that is less than 70%.  Hemoglobin 12.1.  Platelet count 255.        Grzegorz Price  reports that he quit smoking about 10 years ago. His smoking use included cigarettes. He started smoking about 50 years ago. He has a 40 pack-year smoking history. He has been exposed to tobacco smoke. He has never used smokeless tobacco..        Patient or patient representative verbalized consent for the use of Ambient Listening during the visit with  Reg Raza MD for chart documentation. 9/9/2024  10:08 EDT        Assessment and Plan     Assessment & Plan  Carotid stenosis, bilateral             Assessment & Plan  1. Left internal carotid artery stenosis -asymptomatic  The carotid duplex scan from July 2024 revealed significant plaque accumulation on the left side, with velocities of 473/77 and a ratio of 2.8, indicating over 70 percent stenosis. A subsequent CAT scan performed on 08/15/2024 suggested a slightly lower degree of stenosis, estimated at 60 to 65 percent. The lesion does not appear ulcerated, and there is no evidence of clot or thrombus formation. The risk of stroke or mini-stroke associated with this lesion, which is likely less than 70 percent stenotic, is estimated to be around 1 percent per year. He is currently on optimal medical therapy, including antiplatelet (aspirin) and cholesterol-lowering medications, and he does not smoke. Given the current state of his condition, it is recommended to continue close monitoring rather than opting for intervention, as the risks may outweigh the benefits at this stage. Should he experience any symptoms indicative of a stroke, immediate emergency medical attention should be sought.    Follow-up  The patient will follow up in 6 months.  With a new carotid ultrasound.           Follow Up     Return in about 6  months (around 3/9/2025).  Patient was given instructions and counseling regarding his condition or for health maintenance advice. Please see specific information pulled into the AVS if appropriate.

## 2024-09-20 ENCOUNTER — TELEPHONE (OUTPATIENT)
Dept: INTERNAL MEDICINE | Facility: CLINIC | Age: 79
End: 2024-09-20
Payer: MEDICARE

## 2024-09-20 DIAGNOSIS — K21.9 GASTROESOPHAGEAL REFLUX DISEASE, UNSPECIFIED WHETHER ESOPHAGITIS PRESENT: Primary | ICD-10-CM

## 2024-11-18 ENCOUNTER — OFFICE VISIT (OUTPATIENT)
Dept: INTERNAL MEDICINE | Facility: CLINIC | Age: 79
End: 2024-11-18
Payer: MEDICARE

## 2024-11-18 VITALS
BODY MASS INDEX: 24.43 KG/M2 | HEART RATE: 74 BPM | HEIGHT: 67 IN | SYSTOLIC BLOOD PRESSURE: 130 MMHG | OXYGEN SATURATION: 95 % | DIASTOLIC BLOOD PRESSURE: 78 MMHG

## 2024-11-18 DIAGNOSIS — J43.1 PANLOBULAR EMPHYSEMA: Chronic | ICD-10-CM

## 2024-11-18 DIAGNOSIS — J31.0 CHRONIC RHINITIS: Primary | Chronic | ICD-10-CM

## 2024-11-18 PROBLEM — J44.9 COPD (CHRONIC OBSTRUCTIVE PULMONARY DISEASE): Status: ACTIVE | Noted: 2023-06-09

## 2024-11-18 PROCEDURE — 3078F DIAST BP <80 MM HG: CPT | Performed by: NURSE PRACTITIONER

## 2024-11-18 PROCEDURE — 1160F RVW MEDS BY RX/DR IN RCRD: CPT | Performed by: NURSE PRACTITIONER

## 2024-11-18 PROCEDURE — 1126F AMNT PAIN NOTED NONE PRSNT: CPT | Performed by: NURSE PRACTITIONER

## 2024-11-18 PROCEDURE — 1159F MED LIST DOCD IN RCRD: CPT | Performed by: NURSE PRACTITIONER

## 2024-11-18 PROCEDURE — 3075F SYST BP GE 130 - 139MM HG: CPT | Performed by: NURSE PRACTITIONER

## 2024-11-18 PROCEDURE — G2211 COMPLEX E/M VISIT ADD ON: HCPCS | Performed by: NURSE PRACTITIONER

## 2024-11-18 PROCEDURE — 99214 OFFICE O/P EST MOD 30 MIN: CPT | Performed by: NURSE PRACTITIONER

## 2024-11-18 RX ORDER — METHYLPREDNISOLONE 4 MG/1
TABLET ORAL
Qty: 21 EACH | Refills: 0 | Status: SHIPPED | OUTPATIENT
Start: 2024-11-18

## 2024-11-18 RX ORDER — IPRATROPIUM BROMIDE 42 UG/1
2 SPRAY, METERED NASAL 4 TIMES DAILY
Qty: 15 ML | Refills: 12 | Status: SHIPPED | OUTPATIENT
Start: 2024-11-18

## 2024-11-18 RX ORDER — CETIRIZINE HYDROCHLORIDE 5 MG/1
5 TABLET ORAL DAILY
COMMUNITY

## 2024-11-18 NOTE — ASSESSMENT & PLAN NOTE
Cont spiriva, advair   Has albuterol if needed  Recommended mucinex 1200mg BID PRN to help with chronic cough  No wheezing, SOA, RIVERS

## 2024-11-18 NOTE — PROGRESS NOTES
"Chief Complaint  Cough (Months ) and Drainage     Subjective        Grzegorz Price presents to [facility] PRIMARY CARE  This is a 79 y/o male presenting to office for complaints of post nasal drip and intermittent cough. Reports he has noticed increased nasal congestion; reports using astelin and zyrtec PRN; hx of COPD; has not been having SOA or wheezing. Reports he does have yellow productive mucus. Reports this is intermittent. Reports he was unaware his COPD could cause this. Denies any fever. Continues following with Dr. Solis for management of COPD.     Objective   Vital Signs:  /78 (BP Location: Left arm, Patient Position: Sitting, Cuff Size: Adult)   Pulse 74   Ht 170.2 cm (67\")   SpO2 95%   BMI 24.43 kg/m²   Estimated body mass index is 24.43 kg/m² as calculated from the following:    Height as of this encounter: 170.2 cm (67\").    Weight as of 9/9/24: 70.8 kg (156 lb).    BMI is within normal parameters. No other follow-up for BMI required.      Physical Exam  Constitutional:       Appearance: Normal appearance.   HENT:      Head: Normocephalic and atraumatic.      Right Ear: External ear normal.      Left Ear: External ear normal.      Nose: Rhinorrhea present. No congestion.      Mouth/Throat:      Mouth: Mucous membranes are moist.      Pharynx: Oropharynx is clear.   Eyes:      Conjunctiva/sclera: Conjunctivae normal.      Pupils: Pupils are equal, round, and reactive to light.      Comments: +glasses   Cardiovascular:      Rate and Rhythm: Normal rate and regular rhythm.      Pulses: Normal pulses.      Heart sounds: Normal heart sounds. No murmur heard.     No gallop.   Pulmonary:      Effort: Pulmonary effort is normal. No respiratory distress.      Breath sounds: Normal breath sounds. No stridor. No wheezing, rhonchi or rales.   Musculoskeletal:         General: No swelling.      Cervical back: Normal range of motion and neck supple.   Skin:     General: Skin is warm and " dry.      Capillary Refill: Capillary refill takes less than 2 seconds.   Neurological:      Mental Status: He is alert and oriented to person, place, and time. Mental status is at baseline.   Psychiatric:         Mood and Affect: Mood normal.         Thought Content: Thought content normal.         Judgment: Judgment normal.        Result Review :  The following data was reviewed by: TEDDY Kelly on 11/18/2024:  Common labs          7/24/2024    10:19 8/23/2024    10:13 10/10/2024    15:38   Common Labs   WBC 3.28  3.90  4.92    Hemoglobin 12.2  12.7  13.4    Hematocrit 36.5  37.4  39.9    Platelets 277  233  274      Tobacco Use: Medium Risk (11/18/2024)    Patient History     Smoking Tobacco Use: Former     Smokeless Tobacco Use: Never     Passive Exposure: Past     Social History     Substance and Sexual Activity   Alcohol Use No    Comment: Patient is non drinker     Family History   Problem Relation Age of Onset    Other Mother         Old age    Throat cancer Father     Cancer Other         no details    Malig Hyperthermia Neg Hx               Assessment and Plan   Diagnoses and all orders for this visit:    1. Chronic rhinitis (Primary)  Assessment & Plan:  Atrovent nasal spray sent to pharmacy to trial  May benefit to switching to allegra and rotating between that and zyrtec to help with congestion   Nasal saline rinses PRN    Orders:  -     methylPREDNISolone (MEDROL) 4 MG dose pack; Take as directed on package instructions.  Dispense: 21 each; Refill: 0  -     ipratropium (ATROVENT) 0.06 % nasal spray; Administer 2 sprays into the nostril(s) as directed by provider 4 (Four) Times a Day.  Dispense: 15 mL; Refill: 12    2. Panlobular emphysema  Assessment & Plan:  Cont spiriva, advair   Has albuterol if needed  Recommended mucinex 1200mg BID PRN to help with chronic cough  No wheezing, SOA, RIVERS                 Follow Up   Return for Next scheduled follow up 12/11/24.  Patient was given instructions  and counseling regarding his condition or for health maintenance advice. Please see specific information pulled into the AVS if appropriate.

## 2024-11-18 NOTE — ASSESSMENT & PLAN NOTE
Atrovent nasal spray sent to pharmacy to trial  May benefit to switching to allegra and rotating between that and zyrtec to help with congestion   Nasal saline rinses PRN

## 2024-12-06 DIAGNOSIS — I10 PRIMARY HYPERTENSION: Chronic | ICD-10-CM

## 2024-12-09 RX ORDER — AMLODIPINE BESYLATE 10 MG/1
10 TABLET ORAL DAILY
Qty: 90 TABLET | Refills: 1 | Status: SHIPPED | OUTPATIENT
Start: 2024-12-09

## 2024-12-09 RX ORDER — TRIAMTERENE AND HYDROCHLOROTHIAZIDE 37.5; 25 MG/1; MG/1
1 CAPSULE ORAL EVERY MORNING
Qty: 90 CAPSULE | Refills: 1 | Status: SHIPPED | OUTPATIENT
Start: 2024-12-09

## 2024-12-11 ENCOUNTER — OFFICE VISIT (OUTPATIENT)
Dept: INTERNAL MEDICINE | Facility: CLINIC | Age: 79
End: 2024-12-11
Payer: MEDICARE

## 2024-12-11 VITALS
HEIGHT: 67 IN | DIASTOLIC BLOOD PRESSURE: 82 MMHG | WEIGHT: 158 LBS | TEMPERATURE: 96.8 F | SYSTOLIC BLOOD PRESSURE: 124 MMHG | BODY MASS INDEX: 24.8 KG/M2

## 2024-12-11 DIAGNOSIS — I10 PRIMARY HYPERTENSION: Primary | Chronic | ICD-10-CM

## 2024-12-11 DIAGNOSIS — R91.1 LUNG NODULE: Chronic | ICD-10-CM

## 2024-12-11 DIAGNOSIS — I65.29 STENOSIS OF CAROTID ARTERY, UNSPECIFIED LATERALITY: Chronic | ICD-10-CM

## 2024-12-11 DIAGNOSIS — Z85.46 HISTORY OF PROSTATE CANCER: ICD-10-CM

## 2024-12-11 DIAGNOSIS — N18.31 STAGE 3A CHRONIC KIDNEY DISEASE: Chronic | ICD-10-CM

## 2024-12-11 DIAGNOSIS — J43.1 PANLOBULAR EMPHYSEMA: Chronic | ICD-10-CM

## 2024-12-11 DIAGNOSIS — E78.2 MIXED HYPERLIPIDEMIA: Chronic | ICD-10-CM

## 2024-12-11 DIAGNOSIS — J30.1 SEASONAL ALLERGIC RHINITIS DUE TO POLLEN: Chronic | ICD-10-CM

## 2024-12-11 LAB
ALBUMIN SERPL-MCNC: 3.7 G/DL (ref 3.5–5.2)
ALBUMIN/GLOB SERPL: 1.3 G/DL
ALP SERPL-CCNC: 76 U/L (ref 39–117)
ALT SERPL-CCNC: 23 U/L (ref 1–41)
AST SERPL-CCNC: 24 U/L (ref 1–40)
BASOPHILS # BLD AUTO: 0.02 10*3/MM3 (ref 0–0.2)
BASOPHILS NFR BLD AUTO: 0.6 % (ref 0–1.5)
BILIRUB SERPL-MCNC: 0.7 MG/DL (ref 0–1.2)
BUN SERPL-MCNC: 20 MG/DL (ref 8–23)
BUN/CREAT SERPL: 16.9 (ref 7–25)
CALCIUM SERPL-MCNC: 9.7 MG/DL (ref 8.6–10.5)
CHLORIDE SERPL-SCNC: 103 MMOL/L (ref 98–107)
CHOLEST SERPL-MCNC: 163 MG/DL (ref 0–200)
CO2 SERPL-SCNC: 30.3 MMOL/L (ref 22–29)
CREAT SERPL-MCNC: 1.18 MG/DL (ref 0.76–1.27)
EGFRCR SERPLBLD CKD-EPI 2021: 63.6 ML/MIN/1.73
EOSINOPHIL # BLD AUTO: 0.26 10*3/MM3 (ref 0–0.4)
EOSINOPHIL NFR BLD AUTO: 7.3 % (ref 0.3–6.2)
ERYTHROCYTE [DISTWIDTH] IN BLOOD BY AUTOMATED COUNT: 13 % (ref 12.3–15.4)
GLOBULIN SER CALC-MCNC: 2.8 GM/DL
GLUCOSE SERPL-MCNC: 84 MG/DL (ref 65–99)
HCT VFR BLD AUTO: 36.2 % (ref 37.5–51)
HDLC SERPL-MCNC: 54 MG/DL (ref 40–60)
HGB BLD-MCNC: 12.6 G/DL (ref 13–17.7)
IMM GRANULOCYTES # BLD AUTO: 0.01 10*3/MM3 (ref 0–0.05)
IMM GRANULOCYTES NFR BLD AUTO: 0.3 % (ref 0–0.5)
LDLC SERPL CALC-MCNC: 87 MG/DL (ref 0–100)
LYMPHOCYTES # BLD AUTO: 0.62 10*3/MM3 (ref 0.7–3.1)
LYMPHOCYTES NFR BLD AUTO: 17.5 % (ref 19.6–45.3)
MCH RBC QN AUTO: 32.2 PG (ref 26.6–33)
MCHC RBC AUTO-ENTMCNC: 34.8 G/DL (ref 31.5–35.7)
MCV RBC AUTO: 92.6 FL (ref 79–97)
MONOCYTES # BLD AUTO: 0.33 10*3/MM3 (ref 0.1–0.9)
MONOCYTES NFR BLD AUTO: 9.3 % (ref 5–12)
NEUTROPHILS # BLD AUTO: 2.3 10*3/MM3 (ref 1.7–7)
NEUTROPHILS NFR BLD AUTO: 65 % (ref 42.7–76)
NRBC BLD AUTO-RTO: 0 /100 WBC (ref 0–0.2)
PLATELET # BLD AUTO: 267 10*3/MM3 (ref 140–450)
POTASSIUM SERPL-SCNC: 3.8 MMOL/L (ref 3.5–5.2)
PROT SERPL-MCNC: 6.5 G/DL (ref 6–8.5)
RBC # BLD AUTO: 3.91 10*6/MM3 (ref 4.14–5.8)
SODIUM SERPL-SCNC: 143 MMOL/L (ref 136–145)
TRIGL SERPL-MCNC: 124 MG/DL (ref 0–150)
TSH SERPL DL<=0.005 MIU/L-ACNC: 1.77 UIU/ML (ref 0.27–4.2)
VLDLC SERPL CALC-MCNC: 22 MG/DL (ref 5–40)
WBC # BLD AUTO: 3.54 10*3/MM3 (ref 3.4–10.8)

## 2024-12-11 PROCEDURE — 3079F DIAST BP 80-89 MM HG: CPT | Performed by: NURSE PRACTITIONER

## 2024-12-11 PROCEDURE — 1159F MED LIST DOCD IN RCRD: CPT | Performed by: NURSE PRACTITIONER

## 2024-12-11 PROCEDURE — 99214 OFFICE O/P EST MOD 30 MIN: CPT | Performed by: NURSE PRACTITIONER

## 2024-12-11 PROCEDURE — G2211 COMPLEX E/M VISIT ADD ON: HCPCS | Performed by: NURSE PRACTITIONER

## 2024-12-11 PROCEDURE — 1160F RVW MEDS BY RX/DR IN RCRD: CPT | Performed by: NURSE PRACTITIONER

## 2024-12-11 PROCEDURE — 3074F SYST BP LT 130 MM HG: CPT | Performed by: NURSE PRACTITIONER

## 2024-12-11 PROCEDURE — 1126F AMNT PAIN NOTED NONE PRSNT: CPT | Performed by: NURSE PRACTITIONER

## 2024-12-11 RX ORDER — ASPIRIN 81 MG/1
81 TABLET ORAL DAILY
COMMUNITY

## 2024-12-11 NOTE — ASSESSMENT & PLAN NOTE
Cont spiriva, advair   Has albuterol if needed  Denies SOA, Wheezing, CP; reports overall doing well

## 2024-12-11 NOTE — ASSESSMENT & PLAN NOTE
Following with [facility]  Under surveillance   IMPRESSION:  Ill-defined nodular density in the anterior left lower lobe has  resolved. No new nodules or infiltrates           Radiation dose reduction techniques were utilized, including automated  exposure control and exposure modulation based on body size.        This report was finalized on 6/8/2024 1:10 PM by Dr. Terence Sellers M.D  on Workstation: [id number]

## 2024-12-11 NOTE — ASSESSMENT & PLAN NOTE
IMPRESSION:  1. There are prominent emphysematous changes in the lung apices.     2. Cervical spondylosis is present with prominent disc space narrowing,  degenerative endplate changes and endplate spurring from C3 to C6, and  posterior spurs moderate to severely narrow the canal at C3-C4 and mild  to moderately narrow the canal at C4-C5, and up to moderately narrow the  canal at C5-C6 and uncovertebral joint spurs result in mild right and  severe left bony foraminal narrowing at C3-C4, and severe bilateral bony  foraminal narrowing at C4-C5 and C5-C6, and mild bilateral bony  foraminal narrowing at C6-C7.     3. There are prominent calcified plaques throughout the aortic arch.  Calcified plaques and noncalcified plaques mildly narrow the origin, as  well as the proximal midportion of the left subclavian artery. There is  noncalcified plaque that mildly narrows the origin of the left vertebral  artery and calcified plaque mildly narrows the left vertebral artery at  the C6 cervical level and no additional stenosis is seen in the smaller  diameter left vertebral artery. There is calcified plaque that up to  moderately narrows the dominant right vertebral origin. No additional  stenosis is seen in the dominant right vertebral artery to the  vertebrobasilar junction.     4. There is heavily calcified plaque mildly narrowing the right common  carotid bifurcation. I see no stenosis in the cervical segment of the  right internal carotid artery using the NASCET criteria.     5. There is multifocal mild stenosis of the mid and distal left common  carotid artery with some tiny areas of ulceration noncalcified plaques  involving the distal left common carotid artery. There is noncalcified  plaque that mildly narrows the left common carotid bifurcation. There is  multifocal mild stenosis in the origin and proximal 13 mm of the left  internal carotid artery ranging from 20-30%, and there is a heavily  calcified plaque that  involves the proximal cervical segment of left  internal carotid artery between 13 and 17 mm distal to left common  carotid bifurcation, results in up to a 60-65% stenosis of the proximal  cervical segment left internal carotid artery using the NASCET criteria.  The remainder of the CT angiogram of the head and neck is normal.     Radiation dose reduction techniques were utilized, including automated  exposure control and exposure modulation based on body size.        This report was finalized on 8/16/2024 10:02 AM by Dr. Dakota Hale M.D  on Workstation: [id number]      Continues on statin therapy; following with vascular

## 2024-12-11 NOTE — PROGRESS NOTES
"Chief Complaint  COPD, Hyperlipidemia, and Hypertension    Subjective        Grzegorz Price presents to [facility]  History of Present Illness  This is a 79 y/o male presenting to office for f/u with chronic health conditions.     HTN-- continues on amlodipine, dyazide; denies CP or SOA.     HLD-- continues on statin therapy; reports he has not been going to the gym due to caretaking at this time.     Continues on spiriva, advair, albuterol; denies CP or SOA. Reports he has been feeling well.     Continues on statin, aspirin for hx of CAD. Following with vascular.       Objective   Vital Signs:  /82 (BP Location: Left arm, Patient Position: Sitting, Cuff Size: Adult)   Temp 96.8 °F (36 °C) (Temporal)   Ht 170.2 cm (67.01\")   Wt 71.7 kg (158 lb)   BMI 24.74 kg/m²   Estimated body mass index is 24.74 kg/m² as calculated from the following:    Height as of this encounter: 170.2 cm (67.01\").    Weight as of this encounter: 71.7 kg (158 lb).    BMI is within normal parameters. No other follow-up for BMI required.      Physical Exam  Constitutional:       Appearance: Normal appearance.   HENT:      Head: Normocephalic and atraumatic.      Right Ear: External ear normal.      Left Ear: External ear normal.      Nose: Nose normal.      Mouth/Throat:      Mouth: Mucous membranes are moist.      Pharynx: Oropharynx is clear.   Eyes:      Pupils: Pupils are equal, round, and reactive to light.   Cardiovascular:      Rate and Rhythm: Normal rate and regular rhythm.      Pulses: Normal pulses.      Heart sounds: Normal heart sounds. No murmur heard.     No gallop.   Pulmonary:      Effort: Pulmonary effort is normal. No respiratory distress.      Breath sounds: Normal breath sounds. No stridor. No wheezing, rhonchi or rales.   Musculoskeletal:         General: No swelling.      Cervical back: Normal range of motion and neck supple.   Skin:     General: Skin is warm and dry.      Capillary " Refill: Capillary refill takes less than 2 seconds.   Neurological:      General: No focal deficit present.      Mental Status: He is alert and oriented to person, place, and time. Mental status is at baseline.   Psychiatric:         Mood and Affect: Mood normal.         Thought Content: Thought content normal.         Judgment: Judgment normal.        Result Review :  The following data was reviewed by: TEDDY Kelly on 12/11/2024:  Common labs          7/24/2024    10:19 8/23/2024    10:13 10/10/2024    15:38   Common Labs   WBC 3.28  3.90  4.92    Hemoglobin 12.2  12.7  13.4    Hematocrit 36.5  37.4  39.9    Platelets 277  233  274      Tobacco Use: Medium Risk (12/11/2024)    Patient History     Smoking Tobacco Use: Former     Smokeless Tobacco Use: Never     Passive Exposure: Past     Social History     Substance and Sexual Activity   Alcohol Use No    Comment: Patient is non drinker     Family History   Problem Relation Age of Onset    Other Mother         Old age    Throat cancer Father     Cancer Other         no details    Malig Hyperthermia Neg Hx                Assessment and Plan   Diagnoses and all orders for this visit:    1. Primary hypertension (Primary)  Assessment & Plan:  Hypertension is stable and controlled  Continue current treatment regimen.  Dietary sodium restriction.  Regular aerobic exercise.  Blood pressure will be reassessed in 6 months.    Orders:  -     CBC & Differential  -     Comprehensive Metabolic Panel    2. Mixed hyperlipidemia  Assessment & Plan:  Continues on statin therapy    Orders:  -     Lipid Panel  -     TSH Rfx On Abnormal To Free T4    3. Stenosis of carotid artery, unspecified laterality  Assessment & Plan:  IMPRESSION:  1. There are prominent emphysematous changes in the lung apices.     2. Cervical spondylosis is present with prominent disc space narrowing,  degenerative endplate changes and endplate spurring from C3 to C6, and  posterior spurs moderate to  severely narrow the canal at C3-C4 and mild  to moderately narrow the canal at C4-C5, and up to moderately narrow the  canal at C5-C6 and uncovertebral joint spurs result in mild right and  severe left bony foraminal narrowing at C3-C4, and severe bilateral bony  foraminal narrowing at C4-C5 and C5-C6, and mild bilateral bony  foraminal narrowing at C6-C7.     3. There are prominent calcified plaques throughout the aortic arch.  Calcified plaques and noncalcified plaques mildly narrow the origin, as  well as the proximal midportion of the left subclavian artery. There is  noncalcified plaque that mildly narrows the origin of the left vertebral  artery and calcified plaque mildly narrows the left vertebral artery at  the C6 cervical level and no additional stenosis is seen in the smaller  diameter left vertebral artery. There is calcified plaque that up to  moderately narrows the dominant right vertebral origin. No additional  stenosis is seen in the dominant right vertebral artery to the  vertebrobasilar junction.     4. There is heavily calcified plaque mildly narrowing the right common  carotid bifurcation. I see no stenosis in the cervical segment of the  right internal carotid artery using the NASCET criteria.     5. There is multifocal mild stenosis of the mid and distal left common  carotid artery with some tiny areas of ulceration noncalcified plaques  involving the distal left common carotid artery. There is noncalcified  plaque that mildly narrows the left common carotid bifurcation. There is  multifocal mild stenosis in the origin and proximal 13 mm of the left  internal carotid artery ranging from 20-30%, and there is a heavily  calcified plaque that involves the proximal cervical segment of left  internal carotid artery between 13 and 17 mm distal to left common  carotid bifurcation, results in up to a 60-65% stenosis of the proximal  cervical segment left internal carotid artery using the NASCET  criteria.  The remainder of the CT angiogram of the head and neck is normal.     Radiation dose reduction techniques were utilized, including automated  exposure control and exposure modulation based on body size.        This report was finalized on 8/16/2024 10:02 AM by Dr. Dakota Hale M.D  on Workstation: [id number]      Continues on statin therapy; following with vascular      4. Stage 3a chronic kidney disease  Assessment & Plan:  Lab Results   Component Value Date    GLUCOSE 88 06/10/2024    BUN 25 (H) 06/10/2024    CREATININE 1.07 06/10/2024     06/10/2024    K 3.9 06/10/2024     06/10/2024    CALCIUM 9.5 06/10/2024    PROTEINTOT 6.8 08/08/2023    ALBUMIN 4.3 06/10/2024    ALT 55 (H) 06/10/2024    AST 40 06/10/2024    ALKPHOS 71 06/10/2024    BILITOT 0.5 06/10/2024    GLOB 2.0 08/08/2023    AGRATIO 2.4 08/08/2023    BCR 23.4 06/10/2024    ANIONGAP 11.0 08/08/2023    EGFR 68.8 08/08/2023         AVOID NSAIDS.  Hydration goal 64 ounces h20 daily   BP goal less than 130/90  Continue with 150 minutes of weekly exercise per CDC guidelines        5. Panlobular emphysema  Assessment & Plan:  Cont spiriva, advair   Has albuterol if needed  Denies SOA, Wheezing, CP; reports overall doing well       6. Lung nodule  Assessment & Plan:  Following with [facility]  Under surveillance   IMPRESSION:  Ill-defined nodular density in the anterior left lower lobe has  resolved. No new nodules or infiltrates           Radiation dose reduction techniques were utilized, including automated  exposure control and exposure modulation based on body size.        This report was finalized on 6/8/2024 1:10 PM by Dr. Terence Sellers M.D  on Workstation: [id number]      7. Seasonal allergic rhinitis due to pollen  Assessment & Plan:  Continues on atrovent nasal spray  Continue on zyrtec      8. History of prostate cancer  Assessment & Plan:  [facility]; on [id number] surveillance  PSA 0 11/2024               Follow Up    Return in about 6 months (around 6/11/2025) for Medicare Wellness.  Patient was given instructions and counseling regarding his condition or for health maintenance advice. Please see specific information pulled into the AVS if appropriate.

## 2024-12-11 NOTE — ASSESSMENT & PLAN NOTE
Lab Results   Component Value Date    GLUCOSE 88 06/10/2024    BUN 25 (H) 06/10/2024    CREATININE 1.07 06/10/2024     06/10/2024    K 3.9 06/10/2024     06/10/2024    CALCIUM 9.5 06/10/2024    PROTEINTOT 6.8 08/08/2023    ALBUMIN 4.3 06/10/2024    ALT 55 (H) 06/10/2024    AST 40 06/10/2024    ALKPHOS 71 06/10/2024    BILITOT 0.5 06/10/2024    GLOB 2.0 08/08/2023    AGRATIO 2.4 08/08/2023    BCR 23.4 06/10/2024    ANIONGAP 11.0 08/08/2023    EGFR 68.8 08/08/2023         AVOID NSAIDS.  Hydration goal 64 ounces h20 daily   BP goal less than 130/90  Continue with 150 minutes of weekly exercise per CDC guidelines

## 2025-03-17 ENCOUNTER — HOSPITAL ENCOUNTER (OUTPATIENT)
Facility: HOSPITAL | Age: 80
Discharge: HOME | End: 2025-03-17
Admitting: SURGERY
Payer: MEDICARE

## 2025-03-17 ENCOUNTER — OFFICE VISIT (OUTPATIENT)
Age: 80
End: 2025-03-17
Payer: MEDICARE

## 2025-03-17 VITALS
HEIGHT: 67 IN | DIASTOLIC BLOOD PRESSURE: 94 MMHG | WEIGHT: 152.9 LBS | BODY MASS INDEX: 24 KG/M2 | SYSTOLIC BLOOD PRESSURE: 166 MMHG

## 2025-03-17 DIAGNOSIS — I65.23 CAROTID STENOSIS, BILATERAL: Primary | ICD-10-CM

## 2025-03-17 DIAGNOSIS — I65.23 CAROTID STENOSIS, BILATERAL: ICD-10-CM

## 2025-03-17 LAB
BH CV XLRA MEAS LEFT CAROTID BULB EDV: -36.2 CM/SEC
BH CV XLRA MEAS LEFT CAROTID BULB PSV: -154.8 CM/SEC
BH CV XLRA MEAS LEFT DIST CCA EDV: -24.3 CM/SEC
BH CV XLRA MEAS LEFT DIST CCA PSV: -106.6 CM/SEC
BH CV XLRA MEAS LEFT DIST ICA EDV: -26.1 CM/SEC
BH CV XLRA MEAS LEFT DIST ICA PSV: -75.5 CM/SEC
BH CV XLRA MEAS LEFT ICA/CCA RATIO: 7.46
BH CV XLRA MEAS LEFT MID CCA EDV: 17.5 CM/SEC
BH CV XLRA MEAS LEFT MID CCA PSV: 50.4 CM/SEC
BH CV XLRA MEAS LEFT MID ICA EDV: -33.9 CM/SEC
BH CV XLRA MEAS LEFT MID ICA PSV: -92 CM/SEC
BH CV XLRA MEAS LEFT PROX CCA EDV: 18.9 CM/SEC
BH CV XLRA MEAS LEFT PROX CCA PSV: 51.1 CM/SEC
BH CV XLRA MEAS LEFT PROX ECA EDV: 11 CM/SEC
BH CV XLRA MEAS LEFT PROX ECA PSV: 81.5 CM/SEC
BH CV XLRA MEAS LEFT PROX ICA EDV: -54.9 CM/SEC
BH CV XLRA MEAS LEFT PROX ICA PSV: -373.2 CM/SEC
BH CV XLRA MEAS LEFT PROX SCLA PSV: 139 CM/SEC
BH CV XLRA MEAS LEFT VERTEBRAL A EDV: -11.8 CM/SEC
BH CV XLRA MEAS LEFT VERTEBRAL A PSV: -34.9 CM/SEC
BH CV XLRA MEAS RIGHT CAROTID BULB EDV: -18.2 CM/SEC
BH CV XLRA MEAS RIGHT CAROTID BULB PSV: -97.4 CM/SEC
BH CV XLRA MEAS RIGHT DIST CCA EDV: -22.4 CM/SEC
BH CV XLRA MEAS RIGHT DIST CCA PSV: -79.2 CM/SEC
BH CV XLRA MEAS RIGHT DIST ICA EDV: -18.1 CM/SEC
BH CV XLRA MEAS RIGHT DIST ICA PSV: -53.8 CM/SEC
BH CV XLRA MEAS RIGHT ICA/CCA RATIO: 1.23
BH CV XLRA MEAS RIGHT MID CCA EDV: 17.5 CM/SEC
BH CV XLRA MEAS RIGHT MID CCA PSV: 67.3 CM/SEC
BH CV XLRA MEAS RIGHT MID ICA EDV: -21.7 CM/SEC
BH CV XLRA MEAS RIGHT MID ICA PSV: -79.2 CM/SEC
BH CV XLRA MEAS RIGHT PROX CCA EDV: 14 CM/SEC
BH CV XLRA MEAS RIGHT PROX CCA PSV: 69.4 CM/SEC
BH CV XLRA MEAS RIGHT PROX ECA EDV: -12.1 CM/SEC
BH CV XLRA MEAS RIGHT PROX ECA PSV: -98.8 CM/SEC
BH CV XLRA MEAS RIGHT PROX ICA EDV: 18.2 CM/SEC
BH CV XLRA MEAS RIGHT PROX ICA PSV: 97.4 CM/SEC
BH CV XLRA MEAS RIGHT PROX SCLA PSV: 83.2 CM/SEC
BH CV XLRA MEAS RIGHT VERTEBRAL A EDV: -16.2 CM/SEC
BH CV XLRA MEAS RIGHT VERTEBRAL A PSV: -69 CM/SEC
LEFT ARM BP: NORMAL MMHG
RIGHT ARM BP: NORMAL MMHG

## 2025-03-17 PROCEDURE — 1160F RVW MEDS BY RX/DR IN RCRD: CPT | Performed by: NURSE PRACTITIONER

## 2025-03-17 PROCEDURE — 1159F MED LIST DOCD IN RCRD: CPT | Performed by: NURSE PRACTITIONER

## 2025-03-17 PROCEDURE — 93880 EXTRACRANIAL BILAT STUDY: CPT | Performed by: SURGERY

## 2025-03-17 PROCEDURE — 99213 OFFICE O/P EST LOW 20 MIN: CPT | Performed by: NURSE PRACTITIONER

## 2025-03-17 PROCEDURE — 3077F SYST BP >= 140 MM HG: CPT | Performed by: NURSE PRACTITIONER

## 2025-03-17 PROCEDURE — 93880 EXTRACRANIAL BILAT STUDY: CPT

## 2025-03-17 PROCEDURE — 3080F DIAST BP >= 90 MM HG: CPT | Performed by: NURSE PRACTITIONER

## 2025-03-17 NOTE — PROGRESS NOTES
Chief Complaint  Carotid Artery Disease    Subjective        Grzegorz Price presents to [facility]  HPI   Grzegorz Price is a 79 y.o. male that has been followed in our office for carotid artery stenosis.  Last year, his velocities had demonstrated a 70% stenosis therefore he had a CT angiogram.  This demonstrated a 60 to 65% stenosis.  He returns today in follow up along with a carotid duplex. He  reports he has been doing well without hospitalizations or surgeries. He denies any symptoms consistent with CVA, TIA, or amaurosis fugax.     Review of Systems   Constitutional:  Negative for fever.   Eyes:  Negative for visual disturbance.   Cardiovascular:  Negative for leg swelling.   Gastrointestinal:  Negative for abdominal pain.   Musculoskeletal:  Negative for back pain.   Skin:  Negative for color change, pallor and wound.   Neurological:  Negative for dizziness, facial asymmetry, speech difficulty and weakness.        Grzegorz Price  reports that he quit smoking about 11 years ago. His smoking use included cigarettes. He started smoking about 51 years ago. He has a 40 pack-year smoking history. He has been exposed to tobacco smoke. He has never used smokeless tobacco..        Objective   Vital Signs:  Vitals:    03/17/25 1038   BP: 166/94      Body mass index is 23.94 kg/m².   BMI is within normal parameters. No other follow-up for BMI required.       Physical Exam  Vitals reviewed.   Constitutional:       Appearance: Normal appearance.   HENT:      Head: Normocephalic.   Cardiovascular:      Rate and Rhythm: Normal rate and regular rhythm.      Pulses: Normal pulses.           Dorsalis pedis pulses are 3+ on the right side and 3+ on the left side.        Posterior tibial pulses are 3+ on the right side and 3+ on the left side.   Pulmonary:      Effort: Pulmonary effort is normal.   Skin:     General: Skin is warm.   Neurological:      General: No focal deficit present.       Mental Status: He is alert and oriented to person, place, and time.   Psychiatric:         Mood and Affect: Mood normal.          Result Review :  CT Angiogram Carotids (08/15/2024 14:49)     Previous carotid duplex: Duplex Carotid Ultrasound CAR (07/22/2024 10:52)     Carotid duplex from today: Duplex Carotid Ultrasound CAR (03/17/2025 10:28)                    Assessment and Plan     Diagnoses and all orders for this visit:    1. Carotid stenosis, bilateral (Primary)  -     Duplex Carotid Ultrasound CAR; Future             Patient present today for follow up of carotid artery stenosis.  Today, his duplex continues to demonstrate a greater than 70% stenosis to the left ICA, though his CT angiogram done last year shows this to measure 60 to 65%.  He is to continue his antiplatelet agent which is aspirin. He is on a statin for cholesterol control.  We discussed adequate blood pressure control. He will return in 6 months along with a repeat carotid artery duplex.    Follow Up     Return in about 6 months (around 9/17/2025) for carotid duplex.  Patient was given instructions and counseling regarding his condition or for health maintenance advice. Please see specific information pulled into the AVS if appropriate.     TEDDY Ariza

## 2025-05-22 DIAGNOSIS — K21.9 GASTROESOPHAGEAL REFLUX DISEASE, UNSPECIFIED WHETHER ESOPHAGITIS PRESENT: ICD-10-CM

## 2025-05-23 RX ORDER — OMEPRAZOLE 20 MG/1
20 CAPSULE, DELAYED RELEASE ORAL DAILY
Qty: 90 CAPSULE | Refills: 0 | Status: SHIPPED | OUTPATIENT
Start: 2025-05-23

## 2025-06-05 ENCOUNTER — TELEPHONE (OUTPATIENT)
Dept: INTERNAL MEDICINE | Facility: CLINIC | Age: 80
End: 2025-06-05
Payer: MEDICARE

## 2025-06-05 NOTE — TELEPHONE ENCOUNTER
"  Caller: Grzegorz Price \"Art\"    Relationship: Self    Best call back number: [phone]    What is the best time to reach you: ANYTIME     Who are you requesting to speak with (clinical staff, provider,  specific staff member): CLINICAL     What was the call regarding: PATIENT IS CALLING TO SEE IF TEDDY CHAVEZ, WOULD BE WILLING TO TAKE HIS SON, DAUGHTER IN LAW AND 2 GRANDCHILDREN ON AS NEW PATIENTS.     PATIENT WOULD LIKE A CALL BACK TO DISCUSS.     PLEASE ADVISE   "

## 2025-07-03 DIAGNOSIS — I10 PRIMARY HYPERTENSION: Chronic | ICD-10-CM

## 2025-07-03 RX ORDER — AMLODIPINE BESYLATE 10 MG/1
10 TABLET ORAL DAILY
Qty: 90 TABLET | Refills: 1 | Status: SHIPPED | OUTPATIENT
Start: 2025-07-03

## 2025-07-03 RX ORDER — ATORVASTATIN CALCIUM 40 MG/1
40 TABLET, FILM COATED ORAL DAILY
Qty: 90 TABLET | Refills: 1 | Status: SHIPPED | OUTPATIENT
Start: 2025-07-03

## 2025-07-03 RX ORDER — TRIAMTERENE AND HYDROCHLOROTHIAZIDE 37.5; 25 MG/1; MG/1
1 CAPSULE ORAL EVERY MORNING
Qty: 90 CAPSULE | Refills: 1 | Status: SHIPPED | OUTPATIENT
Start: 2025-07-03

## 2025-07-21 DIAGNOSIS — Z85.46 HISTORY OF PROSTATE CANCER: Primary | ICD-10-CM

## 2025-07-21 DIAGNOSIS — R91.1 LUNG NODULE: ICD-10-CM

## 2025-07-21 DIAGNOSIS — R91.8 GROUND GLASS OPACITY PRESENT ON IMAGING OF LUNG: ICD-10-CM

## 2025-07-21 DIAGNOSIS — Z87.891 FORMER SMOKER: ICD-10-CM

## 2025-08-10 ENCOUNTER — HOSPITAL ENCOUNTER (OUTPATIENT)
Facility: HOSPITAL | Age: 80
Discharge: HOME | End: 2025-08-10
Admitting: NURSE PRACTITIONER
Payer: MEDICARE

## 2025-08-10 DIAGNOSIS — R91.8 GROUND GLASS OPACITY PRESENT ON IMAGING OF LUNG: ICD-10-CM

## 2025-08-10 DIAGNOSIS — R91.1 LUNG NODULE: ICD-10-CM

## 2025-08-10 DIAGNOSIS — Z85.46 HISTORY OF PROSTATE CANCER: ICD-10-CM

## 2025-08-10 DIAGNOSIS — Z87.891 FORMER SMOKER: ICD-10-CM

## 2025-08-10 PROCEDURE — 71250 CT THORAX DX C-: CPT

## 2025-08-13 ENCOUNTER — OFFICE VISIT (OUTPATIENT)
Dept: INTERNAL MEDICINE | Facility: CLINIC | Age: 80
End: 2025-08-13
Payer: MEDICARE

## 2025-08-13 VITALS
HEIGHT: 67 IN | OXYGEN SATURATION: 97 % | SYSTOLIC BLOOD PRESSURE: 136 MMHG | DIASTOLIC BLOOD PRESSURE: 78 MMHG | HEART RATE: 71 BPM | WEIGHT: 151 LBS | BODY MASS INDEX: 23.7 KG/M2

## 2025-08-13 DIAGNOSIS — I10 PRIMARY HYPERTENSION: ICD-10-CM

## 2025-08-13 DIAGNOSIS — N18.31 STAGE 3A CHRONIC KIDNEY DISEASE: Primary | ICD-10-CM

## 2025-08-13 DIAGNOSIS — J43.1 PANLOBULAR EMPHYSEMA: ICD-10-CM

## 2025-08-13 DIAGNOSIS — I65.29 STENOSIS OF CAROTID ARTERY, UNSPECIFIED LATERALITY: ICD-10-CM

## 2025-08-13 DIAGNOSIS — E78.2 MIXED HYPERLIPIDEMIA: ICD-10-CM

## 2025-08-13 DIAGNOSIS — Z13.1 SCREENING FOR DIABETES MELLITUS: ICD-10-CM

## 2025-08-13 DIAGNOSIS — Z85.46 HISTORY OF PROSTATE CANCER: ICD-10-CM

## 2025-08-13 DIAGNOSIS — N18.31 STAGE 3A CHRONIC KIDNEY DISEASE: ICD-10-CM

## 2025-08-13 DIAGNOSIS — Z00.00 MEDICARE ANNUAL WELLNESS VISIT, SUBSEQUENT: Primary | ICD-10-CM

## 2025-08-13 DIAGNOSIS — R91.1 LUNG NODULE: ICD-10-CM

## 2025-08-14 ENCOUNTER — OFFICE VISIT (OUTPATIENT)
Dept: SURGERY | Facility: CLINIC | Age: 80
End: 2025-08-14
Payer: MEDICARE

## 2025-08-14 VITALS
HEIGHT: 67 IN | DIASTOLIC BLOOD PRESSURE: 74 MMHG | OXYGEN SATURATION: 96 % | BODY MASS INDEX: 23.7 KG/M2 | HEART RATE: 69 BPM | SYSTOLIC BLOOD PRESSURE: 125 MMHG | WEIGHT: 151 LBS

## 2025-08-14 DIAGNOSIS — Z85.46 HISTORY OF PROSTATE CANCER: Primary | ICD-10-CM

## 2025-08-14 DIAGNOSIS — Z87.891 FORMER SMOKER: ICD-10-CM

## 2025-08-14 DIAGNOSIS — R91.1 LUNG NODULE: ICD-10-CM

## 2025-08-14 LAB
ALBUMIN SERPL-MCNC: 4.4 G/DL (ref 3.8–4.8)
ALP SERPL-CCNC: 89 IU/L (ref 44–121)
ALT SERPL-CCNC: 30 IU/L (ref 0–44)
APPEARANCE UR: CLEAR
AST SERPL-CCNC: 33 IU/L (ref 0–40)
BACTERIA #/AREA URNS HPF: NORMAL /[HPF]
BASOPHILS # BLD AUTO: 0 X10E3/UL (ref 0–0.2)
BASOPHILS NFR BLD AUTO: 0 %
BILIRUB SERPL-MCNC: 0.5 MG/DL (ref 0–1.2)
BILIRUB UR QL STRIP: NEGATIVE
BUN SERPL-MCNC: 30 MG/DL (ref 8–27)
BUN/CREAT SERPL: 29 (ref 10–24)
CALCIUM SERPL-MCNC: 9.6 MG/DL (ref 8.6–10.2)
CASTS URNS QL MICRO: NORMAL /LPF
CHLORIDE SERPL-SCNC: 102 MMOL/L (ref 96–106)
CHOLEST SERPL-MCNC: 146 MG/DL (ref 100–199)
CO2 SERPL-SCNC: 24 MMOL/L (ref 20–29)
COLOR UR: YELLOW
CREAT SERPL-MCNC: 1.03 MG/DL (ref 0.76–1.27)
CREAT UR-MCNC: 222.2 MG/DL
EGFRCR SERPLBLD CKD-EPI 2021: 74 ML/MIN/1.73
EOSINOPHIL # BLD AUTO: 0.2 X10E3/UL (ref 0–0.4)
EOSINOPHIL NFR BLD AUTO: 4 %
EPI CELLS #/AREA URNS HPF: NORMAL /HPF (ref 0–10)
ERYTHROCYTE [DISTWIDTH] IN BLOOD BY AUTOMATED COUNT: 12.5 % (ref 11.6–15.4)
GLOBULIN SER CALC-MCNC: 2.1 G/DL (ref 1.5–4.5)
GLUCOSE SERPL-MCNC: 97 MG/DL (ref 70–99)
GLUCOSE UR QL STRIP: NEGATIVE
HBA1C MFR BLD: 5.9 % (ref 4.8–5.6)
HCT VFR BLD AUTO: 38.9 % (ref 37.5–51)
HDLC SERPL-MCNC: 61 MG/DL
HGB BLD-MCNC: 13.1 G/DL (ref 13–17.7)
HGB UR QL STRIP: NEGATIVE
IMM GRANULOCYTES # BLD AUTO: 0 X10E3/UL (ref 0–0.1)
IMM GRANULOCYTES NFR BLD AUTO: 0 %
KETONES UR QL STRIP: ABNORMAL
LDLC SERPL CALC-MCNC: 57 MG/DL (ref 0–99)
LEUKOCYTE ESTERASE UR QL STRIP: ABNORMAL
LYMPHOCYTES # BLD AUTO: 0.9 X10E3/UL (ref 0.7–3.1)
LYMPHOCYTES NFR BLD AUTO: 18 %
MCH RBC QN AUTO: 31.8 PG (ref 26.6–33)
MCHC RBC AUTO-ENTMCNC: 33.7 G/DL (ref 31.5–35.7)
MCV RBC AUTO: 94 FL (ref 79–97)
MICRO URNS: ABNORMAL
MONOCYTES # BLD AUTO: 0.4 X10E3/UL (ref 0.1–0.9)
MONOCYTES NFR BLD AUTO: 8 %
NEUTROPHILS # BLD AUTO: 3.7 X10E3/UL (ref 1.4–7)
NEUTROPHILS NFR BLD AUTO: 70 %
NITRITE UR QL STRIP: NEGATIVE
PH UR STRIP: 5.5 [PH] (ref 5–7.5)
PLATELET # BLD AUTO: 289 X10E3/UL (ref 150–450)
POTASSIUM SERPL-SCNC: 4.1 MMOL/L (ref 3.5–5.2)
PROT SERPL-MCNC: 6.5 G/DL (ref 6–8.5)
PROT UR QL STRIP: ABNORMAL
PROT UR-MCNC: 28.5 MG/DL
PROT/CREAT UR: 128 MG/G CREAT (ref 0–200)
RBC # BLD AUTO: 4.12 X10E6/UL (ref 4.14–5.8)
RBC #/AREA URNS HPF: NORMAL /HPF (ref 0–2)
SODIUM SERPL-SCNC: 142 MMOL/L (ref 134–144)
SP GR UR STRIP: 1.03 (ref 1–1.03)
TRIGL SERPL-MCNC: 166 MG/DL (ref 0–149)
TSH SERPL DL<=0.005 MIU/L-ACNC: 1.41 UIU/ML (ref 0.45–4.5)
UROBILINOGEN UR STRIP-MCNC: 1 MG/DL (ref 0.2–1)
VLDLC SERPL CALC-MCNC: 28 MG/DL (ref 5–40)
WBC # BLD AUTO: 5.3 X10E3/UL (ref 3.4–10.8)
WBC #/AREA URNS HPF: NORMAL /HPF (ref 0–5)

## (undated) DEVICE — CANN O2 ETCO2 FITS ALL CONN CO2 SMPL A/ 7IN DISP LF

## (undated) DEVICE — SENSR O2 OXIMAX FNGR A/ 18IN NONSTR

## (undated) DEVICE — CANNULA,ADULT,SOFT-TOUCH,7'TUBE,UC: Brand: PENDING

## (undated) DEVICE — TUBING, SUCTION, 1/4" X 10', STRAIGHT: Brand: MEDLINE

## (undated) DEVICE — LN SMPL CO2 SHTRM SD STREAM W/M LUER

## (undated) DEVICE — THE TORRENT IRRIGATION SCOPE CONNECTOR IS USED WITH THE TORRENT IRRIGATION TUBING TO PROVIDE IRRIGATION FLUIDS SUCH AS STERILE WATER DURING GASTROINTESTINAL ENDOSCOPIC PROCEDURES WHEN USED IN CONJUNCTION WITH AN IRRIGATION PUMP (OR ELECTROSURGICAL UNIT).: Brand: TORRENT

## (undated) DEVICE — THE SINGLE USE ETRAP – POLYP TRAP IS USED FOR SUCTION RETRIEVAL OF ENDOSCOPICALLY REMOVED POLYPS.: Brand: ETRAP

## (undated) DEVICE — ADAPT CLN BIOGUARD AIR/H2O DISP

## (undated) DEVICE — KT ORCA ORCAPOD DISP STRL

## (undated) DEVICE — Device: Brand: DEFENDO AIR/WATER/SUCTION AND BIOPSY VALVE

## (undated) DEVICE — SNAR POLYP CAPTIVATOR RND STFF 2.4 240CM 10MM 1P/U

## (undated) DEVICE — SINGLE-USE BIOPSY FORCEPS: Brand: RADIAL JAW 4

## (undated) DEVICE — SNAR POLYP SENSATION STDOVL 27 240 BX40